# Patient Record
Sex: FEMALE | Race: WHITE | NOT HISPANIC OR LATINO | Employment: FULL TIME | URBAN - METROPOLITAN AREA
[De-identification: names, ages, dates, MRNs, and addresses within clinical notes are randomized per-mention and may not be internally consistent; named-entity substitution may affect disease eponyms.]

---

## 2018-03-19 ENCOUNTER — TRANSCRIBE ORDERS (OUTPATIENT)
Dept: ADMINISTRATIVE | Facility: HOSPITAL | Age: 48
End: 2018-03-19

## 2018-03-19 DIAGNOSIS — Z12.31 VISIT FOR SCREENING MAMMOGRAM: Primary | ICD-10-CM

## 2018-03-20 ENCOUNTER — HOSPITAL ENCOUNTER (OUTPATIENT)
Dept: MAMMOGRAPHY | Facility: HOSPITAL | Age: 48
Discharge: HOME | End: 2018-03-20
Admitting: OBSTETRICS & GYNECOLOGY

## 2018-03-20 DIAGNOSIS — Z12.31 VISIT FOR SCREENING MAMMOGRAM: ICD-10-CM

## 2018-03-20 PROCEDURE — 77063 BREAST TOMOSYNTHESIS BI: CPT | Performed by: RADIOLOGY

## 2018-03-20 PROCEDURE — 77067 SCR MAMMO BI INCL CAD: CPT | Performed by: RADIOLOGY

## 2018-03-20 PROCEDURE — 77067 SCR MAMMO BI INCL CAD: CPT

## 2018-03-20 PROCEDURE — 77063 BREAST TOMOSYNTHESIS BI: CPT

## 2019-01-07 ENCOUNTER — TRANSCRIBE ORDERS (OUTPATIENT)
Dept: ADMINISTRATIVE | Facility: HOSPITAL | Age: 49
End: 2019-01-07

## 2019-01-07 DIAGNOSIS — Z12.31 VISIT FOR SCREENING MAMMOGRAM: Primary | ICD-10-CM

## 2019-03-21 ENCOUNTER — HOSPITAL ENCOUNTER (OUTPATIENT)
Dept: MAMMOGRAPHY | Facility: HOSPITAL | Age: 49
Discharge: HOME | End: 2019-03-21
Admitting: OBSTETRICS & GYNECOLOGY

## 2019-03-21 DIAGNOSIS — Z12.31 VISIT FOR SCREENING MAMMOGRAM: ICD-10-CM

## 2019-03-21 PROCEDURE — 77063 BREAST TOMOSYNTHESIS BI: CPT | Performed by: RADIOLOGY

## 2019-03-21 PROCEDURE — 77067 SCR MAMMO BI INCL CAD: CPT | Performed by: RADIOLOGY

## 2019-03-21 PROCEDURE — 77063 BREAST TOMOSYNTHESIS BI: CPT

## 2019-03-21 PROCEDURE — 77067 SCR MAMMO BI INCL CAD: CPT

## 2020-02-10 ENCOUNTER — TRANSCRIBE ORDERS (OUTPATIENT)
Dept: ADMINISTRATIVE | Facility: HOSPITAL | Age: 50
End: 2020-02-10

## 2020-02-10 DIAGNOSIS — Z12.31 VISIT FOR SCREENING MAMMOGRAM: Primary | ICD-10-CM

## 2020-04-08 ENCOUNTER — APPOINTMENT (OUTPATIENT)
Dept: MAMMOGRAPHY | Facility: HOSPITAL | Age: 50
End: 2020-04-08

## 2020-05-07 ENCOUNTER — HOSPITAL ENCOUNTER (OUTPATIENT)
Dept: MAMMOGRAPHY | Facility: HOSPITAL | Age: 50
Discharge: HOME | End: 2020-05-07
Admitting: INTERNAL MEDICINE

## 2020-05-07 DIAGNOSIS — Z12.31 VISIT FOR SCREENING MAMMOGRAM: ICD-10-CM

## 2020-05-07 PROCEDURE — 77067 SCR MAMMO BI INCL CAD: CPT

## 2020-05-07 PROCEDURE — 77063 BREAST TOMOSYNTHESIS BI: CPT | Performed by: RADIOLOGY

## 2020-05-07 PROCEDURE — 77067 SCR MAMMO BI INCL CAD: CPT | Performed by: RADIOLOGY

## 2020-05-07 PROCEDURE — 77063 BREAST TOMOSYNTHESIS BI: CPT

## 2020-05-20 ENCOUNTER — HOSPITAL ENCOUNTER (OUTPATIENT)
Dept: ULTRASOUND IMAGING | Facility: HOSPITAL | Age: 50
Discharge: HOME | End: 2020-05-20

## 2020-05-20 ENCOUNTER — HOSPITAL ENCOUNTER (OUTPATIENT)
Dept: MAMMOGRAPHY | Facility: HOSPITAL | Age: 50
Discharge: HOME | End: 2020-05-20
Admitting: RADIOLOGY

## 2020-05-20 DIAGNOSIS — R92.8 ABNORMAL MAMMOGRAM: ICD-10-CM

## 2020-05-20 PROCEDURE — G0279 TOMOSYNTHESIS, MAMMO: HCPCS

## 2020-05-20 PROCEDURE — 77061 BREAST TOMOSYNTHESIS UNI: CPT | Performed by: RADIOLOGY

## 2020-05-20 PROCEDURE — 77065 DX MAMMO INCL CAD UNI: CPT | Performed by: RADIOLOGY

## 2020-05-20 PROCEDURE — 76642 ULTRASOUND BREAST LIMITED: CPT

## 2020-05-20 PROCEDURE — 77065 DX MAMMO INCL CAD UNI: CPT

## 2020-05-20 PROCEDURE — 76642 ULTRASOUND BREAST LIMITED: CPT | Performed by: RADIOLOGY

## 2021-06-10 ENCOUNTER — HOSPITAL ENCOUNTER (OUTPATIENT)
Dept: MAMMOGRAPHY | Facility: HOSPITAL | Age: 51
Discharge: HOME | End: 2021-06-10
Admitting: INTERNAL MEDICINE

## 2021-06-10 DIAGNOSIS — Z12.39 ENCOUNTER FOR SCREENING BREAST EXAMINATION: ICD-10-CM

## 2021-06-10 PROCEDURE — 77063 BREAST TOMOSYNTHESIS BI: CPT

## 2021-06-10 PROCEDURE — 77067 SCR MAMMO BI INCL CAD: CPT | Performed by: RADIOLOGY

## 2021-06-10 PROCEDURE — 77067 SCR MAMMO BI INCL CAD: CPT

## 2021-06-10 PROCEDURE — 77063 BREAST TOMOSYNTHESIS BI: CPT | Performed by: RADIOLOGY

## 2022-06-10 ENCOUNTER — TRANSCRIBE ORDERS (OUTPATIENT)
Dept: ADMINISTRATIVE | Facility: HOSPITAL | Age: 52
End: 2022-06-10

## 2022-06-10 DIAGNOSIS — Z12.31 ENCOUNTER FOR SCREENING MAMMOGRAM FOR BREAST CANCER: Primary | ICD-10-CM

## 2022-06-30 ENCOUNTER — HOSPITAL ENCOUNTER (OUTPATIENT)
Dept: MAMMOGRAPHY | Facility: HOSPITAL | Age: 52
Discharge: HOME | End: 2022-06-30
Admitting: INTERNAL MEDICINE

## 2022-06-30 DIAGNOSIS — Z12.31 ENCOUNTER FOR SCREENING MAMMOGRAM FOR BREAST CANCER: ICD-10-CM

## 2022-06-30 PROCEDURE — 77063 BREAST TOMOSYNTHESIS BI: CPT

## 2022-06-30 PROCEDURE — 77067 SCR MAMMO BI INCL CAD: CPT

## 2022-06-30 PROCEDURE — 77067 SCR MAMMO BI INCL CAD: CPT | Performed by: RADIOLOGY

## 2022-06-30 PROCEDURE — 77063 BREAST TOMOSYNTHESIS BI: CPT | Performed by: RADIOLOGY

## 2023-07-21 ENCOUNTER — APPOINTMENT (OUTPATIENT)
Dept: GENERAL RADIOLOGY | Facility: HOSPITAL | Age: 53
End: 2023-07-21
Payer: COMMERCIAL

## 2023-07-21 ENCOUNTER — APPOINTMENT (OUTPATIENT)
Dept: CT IMAGING | Facility: HOSPITAL | Age: 53
End: 2023-07-21
Payer: COMMERCIAL

## 2023-07-21 ENCOUNTER — HOSPITAL ENCOUNTER (OUTPATIENT)
Facility: HOSPITAL | Age: 53
Setting detail: OBSERVATION
Discharge: HOME | End: 2023-07-25
Attending: EMERGENCY MEDICINE | Admitting: INTERNAL MEDICINE
Payer: COMMERCIAL

## 2023-07-21 DIAGNOSIS — Z82.3 FAMILY HISTORY OF STROKE: ICD-10-CM

## 2023-07-21 DIAGNOSIS — R20.0 RIGHT FACIAL NUMBNESS: Primary | ICD-10-CM

## 2023-07-21 DIAGNOSIS — Z79.890 CURRENT LONG-TERM USE OF POSTMENOPAUSAL HORMONE REPLACEMENT THERAPY: ICD-10-CM

## 2023-07-21 DIAGNOSIS — R20.0 NUMBNESS AND TINGLING OF RIGHT ARM AND LEG: ICD-10-CM

## 2023-07-21 DIAGNOSIS — R42 DIZZINESS: ICD-10-CM

## 2023-07-21 DIAGNOSIS — R20.2 NUMBNESS AND TINGLING OF RIGHT ARM AND LEG: ICD-10-CM

## 2023-07-21 LAB
ALBUMIN SERPL-MCNC: 4.9 G/DL (ref 3.5–5.2)
ALBUMIN/GLOB SERPL: 1.9 G/DL
ALP SERPL-CCNC: 76 U/L (ref 39–117)
ALT SERPL W P-5'-P-CCNC: 20 U/L (ref 1–33)
ANION GAP SERPL CALCULATED.3IONS-SCNC: 13 MMOL/L (ref 5–15)
AST SERPL-CCNC: 27 U/L (ref 1–32)
BASOPHILS # BLD AUTO: 0.03 10*3/MM3 (ref 0–0.2)
BASOPHILS NFR BLD AUTO: 0.5 % (ref 0–1.5)
BILIRUB SERPL-MCNC: 0.7 MG/DL (ref 0–1.2)
BUN BLDA-MCNC: 7 MG/DL (ref 8–26)
BUN SERPL-MCNC: 8 MG/DL (ref 6–20)
BUN/CREAT SERPL: 12.5 (ref 7–25)
CA-I BLDA-SCNC: 1.22 MMOL/L (ref 1.2–1.32)
CALCIUM SPEC-SCNC: 9.8 MG/DL (ref 8.6–10.5)
CHLORIDE BLDA-SCNC: 100 MMOL/L (ref 98–109)
CHLORIDE SERPL-SCNC: 103 MMOL/L (ref 98–107)
CO2 BLDA-SCNC: 24 MMOL/L (ref 24–29)
CO2 SERPL-SCNC: 25 MMOL/L (ref 22–29)
CREAT BLDA-MCNC: 0.6 MG/DL (ref 0.6–1.3)
CREAT SERPL-MCNC: 0.64 MG/DL (ref 0.57–1)
DEPRECATED RDW RBC AUTO: 41.1 FL (ref 37–54)
EGFRCR SERPLBLD CKD-EPI 2021: 105.2 ML/MIN/1.73
EGFRCR SERPLBLD CKD-EPI 2021: 106.8 ML/MIN/1.73
EOSINOPHIL # BLD AUTO: 0.03 10*3/MM3 (ref 0–0.4)
EOSINOPHIL NFR BLD AUTO: 0.5 % (ref 0.3–6.2)
ERYTHROCYTE [DISTWIDTH] IN BLOOD BY AUTOMATED COUNT: 12.1 % (ref 12.3–15.4)
GLOBULIN UR ELPH-MCNC: 2.6 GM/DL
GLUCOSE BLDC GLUCOMTR-MCNC: 83 MG/DL (ref 70–130)
GLUCOSE SERPL-MCNC: 88 MG/DL (ref 65–99)
HCT VFR BLD AUTO: 42.5 % (ref 34–46.6)
HCT VFR BLDA CALC: 43 % (ref 38–51)
HGB BLD-MCNC: 14.3 G/DL (ref 12–15.9)
HGB BLDA-MCNC: 14.6 G/DL (ref 12–17)
HOLD SPECIMEN: NORMAL
HOLD SPECIMEN: NORMAL
IMM GRANULOCYTES # BLD AUTO: 0.01 10*3/MM3 (ref 0–0.05)
IMM GRANULOCYTES NFR BLD AUTO: 0.2 % (ref 0–0.5)
LYMPHOCYTES # BLD AUTO: 2.46 10*3/MM3 (ref 0.7–3.1)
LYMPHOCYTES NFR BLD AUTO: 39 % (ref 19.6–45.3)
MCH RBC QN AUTO: 31.2 PG (ref 26.6–33)
MCHC RBC AUTO-ENTMCNC: 33.6 G/DL (ref 31.5–35.7)
MCV RBC AUTO: 92.6 FL (ref 79–97)
MONOCYTES # BLD AUTO: 0.46 10*3/MM3 (ref 0.1–0.9)
MONOCYTES NFR BLD AUTO: 7.3 % (ref 5–12)
NEUTROPHILS NFR BLD AUTO: 3.32 10*3/MM3 (ref 1.7–7)
NEUTROPHILS NFR BLD AUTO: 52.5 % (ref 42.7–76)
NRBC BLD AUTO-RTO: 0 /100 WBC (ref 0–0.2)
PLATELET # BLD AUTO: 341 10*3/MM3 (ref 140–450)
PMV BLD AUTO: 9.2 FL (ref 6–12)
POTASSIUM BLDA-SCNC: 3.7 MMOL/L (ref 3.5–4.9)
POTASSIUM SERPL-SCNC: 3.8 MMOL/L (ref 3.5–5.2)
PROT SERPL-MCNC: 7.5 G/DL (ref 6–8.5)
RBC # BLD AUTO: 4.59 10*6/MM3 (ref 3.77–5.28)
SODIUM BLD-SCNC: 139 MMOL/L (ref 138–146)
SODIUM SERPL-SCNC: 141 MMOL/L (ref 136–145)
T4 FREE SERPL-MCNC: 1.16 NG/DL (ref 0.93–1.7)
TROPONIN T SERPL HS-MCNC: 10 NG/L
TSH SERPL DL<=0.05 MIU/L-ACNC: 0.45 UIU/ML (ref 0.27–4.2)
WBC NRBC COR # BLD: 6.31 10*3/MM3 (ref 3.4–10.8)
WHOLE BLOOD HOLD COAG: NORMAL
WHOLE BLOOD HOLD SPECIMEN: NORMAL

## 2023-07-21 PROCEDURE — 71045 X-RAY EXAM CHEST 1 VIEW: CPT

## 2023-07-21 PROCEDURE — 0042T HC CT CEREBRAL PERFUSION W/WO CONTRAST: CPT

## 2023-07-21 PROCEDURE — G0378 HOSPITAL OBSERVATION PER HR: HCPCS

## 2023-07-21 PROCEDURE — 93005 ELECTROCARDIOGRAM TRACING: CPT | Performed by: PHYSICIAN ASSISTANT

## 2023-07-21 PROCEDURE — 99222 1ST HOSP IP/OBS MODERATE 55: CPT | Performed by: INTERNAL MEDICINE

## 2023-07-21 PROCEDURE — 85014 HEMATOCRIT: CPT

## 2023-07-21 PROCEDURE — 70498 CT ANGIOGRAPHY NECK: CPT

## 2023-07-21 PROCEDURE — 99204 OFFICE O/P NEW MOD 45 MIN: CPT

## 2023-07-21 PROCEDURE — 70496 CT ANGIOGRAPHY HEAD: CPT

## 2023-07-21 PROCEDURE — 80050 GENERAL HEALTH PANEL: CPT | Performed by: EMERGENCY MEDICINE

## 2023-07-21 PROCEDURE — 70450 CT HEAD/BRAIN W/O DYE: CPT

## 2023-07-21 PROCEDURE — 99285 EMERGENCY DEPT VISIT HI MDM: CPT

## 2023-07-21 PROCEDURE — 93010 ELECTROCARDIOGRAM REPORT: CPT | Performed by: INTERNAL MEDICINE

## 2023-07-21 PROCEDURE — 84484 ASSAY OF TROPONIN QUANT: CPT | Performed by: PHYSICIAN ASSISTANT

## 2023-07-21 PROCEDURE — 80047 BASIC METABLC PNL IONIZED CA: CPT

## 2023-07-21 PROCEDURE — 25510000001 IOPAMIDOL PER 1 ML: Performed by: EMERGENCY MEDICINE

## 2023-07-21 PROCEDURE — 84439 ASSAY OF FREE THYROXINE: CPT | Performed by: PHYSICIAN ASSISTANT

## 2023-07-21 RX ORDER — ATORVASTATIN CALCIUM 40 MG/1
80 TABLET, FILM COATED ORAL NIGHTLY
Status: DISCONTINUED | OUTPATIENT
Start: 2023-07-21 | End: 2023-07-24

## 2023-07-21 RX ORDER — SODIUM CHLORIDE 0.9 % (FLUSH) 0.9 %
10 SYRINGE (ML) INJECTION AS NEEDED
Status: DISCONTINUED | OUTPATIENT
Start: 2023-07-21 | End: 2023-07-25 | Stop reason: HOSPADM

## 2023-07-21 RX ORDER — SODIUM CHLORIDE 0.9 % (FLUSH) 0.9 %
10 SYRINGE (ML) INJECTION EVERY 12 HOURS SCHEDULED
Status: DISCONTINUED | OUTPATIENT
Start: 2023-07-22 | End: 2023-07-25 | Stop reason: HOSPADM

## 2023-07-21 RX ORDER — ASPIRIN 300 MG/1
300 SUPPOSITORY RECTAL DAILY
Status: DISCONTINUED | OUTPATIENT
Start: 2023-07-22 | End: 2023-07-25 | Stop reason: HOSPADM

## 2023-07-21 RX ORDER — ASPIRIN 81 MG/1
81 TABLET, CHEWABLE ORAL DAILY
Status: DISCONTINUED | OUTPATIENT
Start: 2023-07-22 | End: 2023-07-25 | Stop reason: HOSPADM

## 2023-07-21 RX ORDER — ASPIRIN 81 MG/1
162 TABLET, CHEWABLE ORAL ONCE
Status: COMPLETED | OUTPATIENT
Start: 2023-07-21 | End: 2023-07-21

## 2023-07-21 RX ORDER — SODIUM CHLORIDE 9 MG/ML
40 INJECTION, SOLUTION INTRAVENOUS AS NEEDED
Status: DISCONTINUED | OUTPATIENT
Start: 2023-07-21 | End: 2023-07-25 | Stop reason: HOSPADM

## 2023-07-21 RX ADMIN — ATORVASTATIN CALCIUM 80 MG: 40 TABLET, FILM COATED ORAL at 21:55

## 2023-07-21 RX ADMIN — IOPAMIDOL 115 ML: 755 INJECTION, SOLUTION INTRAVENOUS at 20:04

## 2023-07-21 RX ADMIN — ASPIRIN 162 MG: 81 TABLET, CHEWABLE ORAL at 21:54

## 2023-07-21 NOTE — Clinical Note
Level of Care: Telemetry [5]   Diagnosis: Facial numbness [086035]   Admitting Physician: LORRAINE VILLAVICENCIO [[id number]]   Attending Physician: LORRAINE VILLAVICENCIO [[id number]]   Bed Request Comments: tele

## 2023-07-21 NOTE — ED PROVIDER NOTES
"Subjective   History of Present Illness  This is a healthy 53-year-old female that presents the ER with sudden onset of right facial numbness and tingling and tingling to right upper extremity and lower extremity that occurred at 5 PM.  Patient said that initially symptoms lasted 30 seconds and resolved completely.  She felt transient lightheadedness and dizziness, as well  Patient says that she took 2 aspirin 81 mg by mouth at symptom onset.  Symptoms recurred at 1830 and were less intense and lasted only 10 to 15 seconds.  Patient denies any neurologic symptoms at this time.  She denied headache, visual changes, or any difficulty thought forming or speech changes.  Patient denies personal history of hypertension or hyperlipidemia.  She says that total cholesterol is around 200 but HDL is significantly higher than LDL, so she has never been recommended to take statin medications.  She is a non-smoker.  She does report being postmenopausal and takes oral progesterone and utilizes estrogen and testosterone pellets.  She had new pellets placed last week.  She denies any chest pain or shortness of breath.  She reports family history of TIA in her sister at around the same age and she also reports history of stroke in her father at age 80.    History provided by:  Patient  Neurologic Problem  The patient's primary symptoms include focal sensory loss. The patient's pertinent negatives include no altered mental status, clumsiness, focal weakness, loss of balance, memory loss, near-syncope, slurred speech, syncope, visual change or weakness. Primary symptoms comment: Initial time, pt felt some \"spinning sensation\", which quickly resolved.. This is a new problem. The current episode started today. The neurological problem developed suddenly. The last time the patient was known to be well was 7/21/2023 5:00 PM.  The problem has been resolved since onset. There was right-sided, upper extremity, lower extremity and facial " focality noted. Associated symptoms include dizziness and light-headedness. Pertinent negatives include no abdominal pain, auditory change, aura, back pain, bladder incontinence, bowel incontinence, chest pain, confusion, diaphoresis, fatigue, fever, headaches, nausea, neck pain, palpitations, shortness of breath, vertigo or vomiting. Past treatments include aspirin (2 ASA 81 mg po).     Review of Systems   Constitutional: Negative.  Negative for diaphoresis, fatigue and fever.   Respiratory: Negative.  Negative for shortness of breath.    Cardiovascular: Negative.  Negative for chest pain, palpitations and near-syncope.   Gastrointestinal:  Negative for abdominal pain, bowel incontinence, nausea and vomiting.   Genitourinary: Negative.  Negative for bladder incontinence, dysuria, flank pain, frequency and urgency.        LMP: s/p menopausal. Pt is on progesterone orally and also gets hormone pellets with implant last week.   Musculoskeletal:  Negative for back pain and neck pain.   Neurological:  Positive for dizziness, light-headedness and numbness. Negative for vertigo, focal weakness, seizures, syncope, facial asymmetry, speech difficulty, weakness, headaches and loss of balance.        Transient right facial, RUE, and RLE numbness tingling that lasted 30 seconds at 1700 accompanied with dizziness.  This occurred again at 1830, less intense, and lasted 10-15 seconds.   Psychiatric/Behavioral:  Negative for confusion and memory loss.    All other systems reviewed and are negative.    History reviewed. No pertinent past medical history.    Allergies   Allergen Reactions    Cefdinir GI Intolerance    Erythromycin GI Intolerance    Penicillins Hives    Sulfa Antibiotics GI Intolerance       Past Surgical History:   Procedure Laterality Date    BREAST EXCISIONAL BIOPSY Left 2008    NASAL SINUS SURGERY         Family History   Problem Relation Age of Onset    Breast cancer Sister 48    Ovarian cancer Neg Hx         Social History     Socioeconomic History    Marital status:    Tobacco Use    Smoking status: Never    Smokeless tobacco: Never   Substance and Sexual Activity    Alcohol use: Not Currently    Drug use: Never    Sexual activity: Defer           Objective   Physical Exam  Vitals and nursing note reviewed.   Constitutional:       General: She is not in acute distress.     Appearance: Normal appearance. She is not ill-appearing, toxic-appearing or diaphoretic.      Comments: No acute sign of pain or distress.  Nontoxic.   HENT:      Head: Normocephalic and atraumatic.      Nose: Nose normal.      Mouth/Throat:      Mouth: Mucous membranes are moist.      Pharynx: Oropharynx is clear.   Eyes:      Extraocular Movements: Extraocular movements intact.      Conjunctiva/sclera: Conjunctivae normal.      Pupils: Pupils are equal, round, and reactive to light.   Neck:      Vascular: No carotid bruit.      Comments: No carotid bruits bilaterally  Cardiovascular:      Rate and Rhythm: Normal rate and regular rhythm. No extrasystoles are present.     Pulses: Normal pulses.      Heart sounds: Normal heart sounds. No murmur heard.     Comments: Regular rate and rhythm.  No ectopy.  No murmurs appreciated.  No pedal edema to lower extremities.  Pulmonary:      Effort: Pulmonary effort is normal.      Breath sounds: Normal breath sounds.      Comments: Lungs are clear to auscultation bilaterally  Abdominal:      General: Bowel sounds are normal.      Palpations: Abdomen is soft.   Musculoskeletal:         General: Normal range of motion.      Cervical back: Normal range of motion and neck supple.      Right lower leg: No edema.      Left lower leg: No edema.   Skin:     General: Skin is warm and dry.   Neurological:      General: No focal deficit present.      Mental Status: She is alert and oriented to person, place, and time.      Cranial Nerves: Cranial nerves 2-12 are intact.      Sensory: Sensation is intact.       Motor: Motor function is intact.      Coordination: Coordination is intact.      Gait: Gait is intact.      Comments: Alert and oriented x3.  Smile is equal and tongue is midline.  Normal finger-to-nose touch.  Equal  strength 5 out of 5 and equal muscle strength to lower extremities 5 out of 5.  Speech is clear and concentrated.  No expressive aphasia.  No focal deficits.       Procedures           ED Course  ED Course as of 07/21/23 2145 Fri Jul 21, 2023 1945 Patient is neurologic exam is completely normal.  Blood pressure is mildly elevated at 170/94.  Discussed the case with Dr. Ayala, ER attending physician.  He is also going to see and evaluate the patient now and I contacted stroke navigator nurse practitioner, Juan Luis, and he is going to come and evaluate the patient, as well to see if CT perfusion studies are recommended.  Patient denies any neurologic symptoms at this time. [FC]   1950 Evaluated this patient in person.  She reports 2 episodes of a tingling sensation in her right foot, right hand, and the right side of the face primarily the right side of the upper and lower lip.  1 episode occurred at 5 PM and lasted for 30 seconds and resolve spontaneously.  The second episode occurred at 6:30 PM and lasted 20 seconds and resolve spontaneously.  She denies any symptoms currently.  I have discussed this patient with the stroke service and they have recommended CT imaging evaluation which has been ordered.  The patient was called a code stroke [NS]   2017 Juan Luis stroke navigator nurse practitioner, came and reported that patient's CT of the brain without contrast and CT perfusion studies were normal.  NIH is 0.  He is going to discuss the case with neurology on-call, , to see if he recommends MRI of the brain without contrast.  Patient is resting comfortably. [FC]   2105 Juan Luis stroke navigator nurse practitioner discussed the case with Dr. Walter, neurologist on-call.  He recommended  overnight observation for further studies.  Patient took aspirin 81 mg by mouth x2 doses prior to arrival and they recommended aspirin 81 mg by mouth x2 more tablets.  Juan Luis will go update the patient on neurology's recommendations for admission.  I had updated patient and spouse that neurologist was reviewing the case.  Patient was resting comfortably and vital signs were stable.  They were very appreciative. [FC]   2145 Discussed admission with Dr. Ramsay, hospitalist.  She is agreeable to admission on telemetry.  Patient and spouse updated on all results and recommendations per neurology and they are agreeable. [FC]      ED Course User Index  [FC] Marissa Franco PA-C  [NS] Marcelle Ayala MD                Recent Results (from the past 24 hour(s))   ECG 12 Lead Other; neuro changes    Collection Time: 07/21/23  7:39 PM   Result Value Ref Range    QT Interval 408 ms    QTC Interval 431 ms   Comprehensive Metabolic Panel    Collection Time: 07/21/23  8:01 PM    Specimen: Arm, Left; Blood   Result Value Ref Range    Glucose 88 65 - 99 mg/dL    BUN 8 6 - 20 mg/dL    Creatinine 0.64 0.57 - 1.00 mg/dL    Sodium 141 136 - 145 mmol/L    Potassium 3.8 3.5 - 5.2 mmol/L    Chloride 103 98 - 107 mmol/L    CO2 25.0 22.0 - 29.0 mmol/L    Calcium 9.8 8.6 - 10.5 mg/dL    Total Protein 7.5 6.0 - 8.5 g/dL    Albumin 4.9 3.5 - 5.2 g/dL    ALT (SGPT) 20 1 - 33 U/L    AST (SGOT) 27 1 - 32 U/L    Alkaline Phosphatase 76 39 - 117 U/L    Total Bilirubin 0.7 0.0 - 1.2 mg/dL    Globulin 2.6 gm/dL    A/G Ratio 1.9 g/dL    BUN/Creatinine Ratio 12.5 7.0 - 25.0    Anion Gap 13.0 5.0 - 15.0 mmol/L    eGFR 105.2 >60.0 mL/min/1.73   Green Top (Gel)    Collection Time: 07/21/23  8:01 PM   Result Value Ref Range    Extra Tube Hold for add-ons.    Lavender Top    Collection Time: 07/21/23  8:01 PM   Result Value Ref Range    Extra Tube hold for add-on    Gold Top - SST    Collection Time: 07/21/23  8:01 PM   Result Value Ref Range    Extra  Tube Hold for add-ons.    Light Blue Top    Collection Time: 07/21/23  8:01 PM   Result Value Ref Range    Extra Tube Hold for add-ons.    CBC Auto Differential    Collection Time: 07/21/23  8:01 PM    Specimen: Arm, Left; Blood   Result Value Ref Range    WBC 6.31 3.40 - 10.80 10*3/mm3    RBC 4.59 3.77 - 5.28 10*6/mm3    Hemoglobin 14.3 12.0 - 15.9 g/dL    Hematocrit 42.5 34.0 - 46.6 %    MCV 92.6 79.0 - 97.0 fL    MCH 31.2 26.6 - 33.0 pg    MCHC 33.6 31.5 - 35.7 g/dL    RDW 12.1 (L) 12.3 - 15.4 %    RDW-SD 41.1 37.0 - 54.0 fl    MPV 9.2 6.0 - 12.0 fL    Platelets 341 140 - 450 10*3/mm3    Neutrophil % 52.5 42.7 - 76.0 %    Lymphocyte % 39.0 19.6 - 45.3 %    Monocyte % 7.3 5.0 - 12.0 %    Eosinophil % 0.5 0.3 - 6.2 %    Basophil % 0.5 0.0 - 1.5 %    Immature Grans % 0.2 0.0 - 0.5 %    Neutrophils, Absolute 3.32 1.70 - 7.00 10*3/mm3    Lymphocytes, Absolute 2.46 0.70 - 3.10 10*3/mm3    Monocytes, Absolute 0.46 0.10 - 0.90 10*3/mm3    Eosinophils, Absolute 0.03 0.00 - 0.40 10*3/mm3    Basophils, Absolute 0.03 0.00 - 0.20 10*3/mm3    Immature Grans, Absolute 0.01 0.00 - 0.05 10*3/mm3    nRBC 0.0 0.0 - 0.2 /100 WBC   T4, Free    Collection Time: 07/21/23  8:01 PM    Specimen: Arm, Left; Blood   Result Value Ref Range    Free T4 1.16 0.93 - 1.70 ng/dL   TSH    Collection Time: 07/21/23  8:01 PM    Specimen: Arm, Left; Blood   Result Value Ref Range    TSH 0.445 0.270 - 4.200 uIU/mL   Single High Sensitivity Troponin T    Collection Time: 07/21/23  8:01 PM    Specimen: Arm, Left; Blood   Result Value Ref Range    HS Troponin T 10 (H) <10 ng/L   POC CHEM 8    Collection Time: 07/21/23  8:01 PM    Specimen: Blood   Result Value Ref Range    Glucose 83 70 - 130 mg/dL    BUN 7 (L) 8 - 26 mg/dL    Creatinine 0.60 0.60 - 1.30 mg/dL    Sodium 139 138 - 146 mmol/L    POC Potassium 3.7 3.5 - 4.9 mmol/L    Chloride 100 98 - 109 mmol/L    Total CO2 24 24 - 29 mmol/L    Hemoglobin 14.6 12.0 - 17.0 g/dL    Hematocrit 43 38 - 51 %     Ionized Calcium 1.22 1.20 - 1.32 mmol/L    eGFR 106.8 >60.0 mL/min/1.73     Note: In addition to lab results from this visit, the labs listed above may include labs taken at another facility or during a different encounter within the last 24 hours. Please correlate lab times with ED admission and discharge times for further clarification of the services performed during this visit.    XR Chest 1 View   Final Result   Impression:   No evidence of active chest disease.         Electronically Signed: Tru Hernandes     7/21/2023 8:59 PM EDT     Workstation ID: [id number]      CT Angiogram Head w AI Analysis of LVO   Final Result   Neck CTA appears within normal limits.      CTA HEAD: Distal vertebral arteries, basilar artery and posterior cerebral arteries appear within normal limits. There is mild calcification of the supraclinoid right and left ICA. No significant intracranial ICA stenosis is seen. Anterior and middle    cerebral arteries and proximal branches appear within normal limits. No significant stenosis is seen. The major dural venous sinuses appear to opacify normally with contrast.      Impression:   CTA of the head appears within normal limits.         Electronically Signed: Tru Hernandes     7/21/2023 8:41 PM EDT     Workstation ID: [id number]      CT Angiogram Neck   Final Result   Neck CTA appears within normal limits.      CTA HEAD: Distal vertebral arteries, basilar artery and posterior cerebral arteries appear within normal limits. There is mild calcification of the supraclinoid right and left ICA. No significant intracranial ICA stenosis is seen. Anterior and middle    cerebral arteries and proximal branches appear within normal limits. No significant stenosis is seen. The major dural venous sinuses appear to opacify normally with contrast.      Impression:   CTA of the head appears within normal limits.         Electronically Signed: Tru Hernandes     7/21/2023 8:41 PM EDT     Workstation ID: [id number]      CT  "CEREBRAL PERFUSION WITH & WITHOUT CONTRAST   Final Result   Impression:   Negative perfusion scan.            Electronically Signed: Tru Cecilio     7/21/2023 8:29 PM EDT     Workstation ID: [id number]      CT Head Without Contrast Stroke Protocol   Final Result   Impression:      1. No evidence of acute intracranial disease.      2. Incidentally noted left sphenoid and ethmoid sinus disease.            Electronically Signed: Tru Cecilio     7/21/2023 8:08 PM EDT     Workstation ID: [id number]      MRI Brain Without Contrast    (Results Pending)     Vitals:    07/21/23 1915 07/21/23 2016 07/21/23 2017 07/21/23 2030   BP: 170/94 141/77  135/74   BP Location: Right arm   Right arm   Patient Position: Sitting   Sitting   Pulse: 71  85 71   Resp: 16      Temp: 97.9 °F (36.6 °C)      TempSrc: Oral      SpO2: 98%  99% 99%   Weight: 54.4 kg (120 lb)      Height: 152.4 cm (60\")        Medications   sodium chloride 0.9 % flush 10 mL (has no administration in time range)   sodium chloride 0.9 % flush 10 mL (has no administration in time range)   aspirin chewable tablet 162 mg (has no administration in time range)   atorvastatin (LIPITOR) tablet 80 mg (has no administration in time range)   iopamidol (ISOVUE-370) 76 % injection 150 mL (115 mL Intravenous Given 7/21/23 2004)     ECG/EMG Results (last 24 hours)       ** No results found for the last 24 hours. **          ECG 12 Lead Other; neuro changes   Preliminary Result   Test Reason : Other~   Blood Pressure :   */*   mmHG   Vent. Rate :  67 BPM     Atrial Rate :  67 BPM      P-R Int : 154 ms          QRS Dur :  84 ms       QT Int : 408 ms       P-R-T Axes :  70  49  46 degrees      QTc Int : 431 ms      Normal sinus rhythm   Normal ECG   No previous ECGs available      Referred By:            Confirmed By:                                        Medical Decision Making  Problems Addressed:  Current long-term use of postmenopausal hormone replacement therapy: complicated acute " illness or injury  Dizziness: complicated acute illness or injury  Family history of stroke: complicated acute illness or injury  Numbness and tingling of right arm and leg: complicated acute illness or injury  Right facial numbness: complicated acute illness or injury    Amount and/or Complexity of Data Reviewed  Labs: ordered.  Radiology: ordered.  ECG/medicine tests: ordered.    Risk  OTC drugs.  Prescription drug management.  Decision regarding hospitalization.        Final diagnoses:   Right facial numbness   Numbness and tingling of right arm and leg   Dizziness   Current long-term use of postmenopausal hormone replacement therapy   Family history of stroke       ED Disposition  ED Disposition       ED Disposition   Decision to Admit    Condition   --    Comment   Level of Care: Telemetry [5]   Diagnosis: Facial numbness [997980]   Admitting Physician: LORRAINE VILLAVICENCIO [[id number]]   Attending Physician: LORRAINE VILLAVICENCIO [[id number]]   Bed Request Comments: tele                 No follow-up provider specified.       Medication List      No changes were made to your prescriptions during this visit.            Marissa Franco PA-C  07/21/23 7502

## 2023-07-22 ENCOUNTER — APPOINTMENT (OUTPATIENT)
Dept: CARDIOLOGY | Facility: HOSPITAL | Age: 53
End: 2023-07-22
Payer: COMMERCIAL

## 2023-07-22 ENCOUNTER — APPOINTMENT (OUTPATIENT)
Dept: MRI IMAGING | Facility: HOSPITAL | Age: 53
End: 2023-07-22
Payer: COMMERCIAL

## 2023-07-22 LAB
CHOLEST SERPL-MCNC: 146 MG/DL (ref 0–200)
CRP SERPL-MCNC: <0.3 MG/DL (ref 0–0.5)
ERYTHROCYTE [SEDIMENTATION RATE] IN BLOOD: 6 MM/HR (ref 0–30)
GLUCOSE BLDC GLUCOMTR-MCNC: 134 MG/DL (ref 70–130)
GLUCOSE BLDC GLUCOMTR-MCNC: 80 MG/DL (ref 70–130)
GLUCOSE BLDC GLUCOMTR-MCNC: 87 MG/DL (ref 70–130)
GLUCOSE BLDC GLUCOMTR-MCNC: 91 MG/DL (ref 70–130)
HBA1C MFR BLD: 5 % (ref 4.8–5.6)
HDLC SERPL-MCNC: 52 MG/DL (ref 40–60)
HOLD SPECIMEN: NORMAL
LDLC SERPL CALC-MCNC: 83 MG/DL (ref 0–100)
LDLC/HDLC SERPL: 1.6 {RATIO}
TRIGL SERPL-MCNC: 54 MG/DL (ref 0–150)
VLDLC SERPL-MCNC: 11 MG/DL (ref 5–40)

## 2023-07-22 PROCEDURE — 97165 OT EVAL LOW COMPLEX 30 MIN: CPT

## 2023-07-22 PROCEDURE — 99214 OFFICE O/P EST MOD 30 MIN: CPT | Performed by: PSYCHIATRY & NEUROLOGY

## 2023-07-22 PROCEDURE — G0378 HOSPITAL OBSERVATION PER HR: HCPCS

## 2023-07-22 PROCEDURE — 99232 SBSQ HOSP IP/OBS MODERATE 35: CPT | Performed by: INTERNAL MEDICINE

## 2023-07-22 PROCEDURE — 85652 RBC SED RATE AUTOMATED: CPT | Performed by: PSYCHIATRY & NEUROLOGY

## 2023-07-22 PROCEDURE — 82948 REAGENT STRIP/BLOOD GLUCOSE: CPT

## 2023-07-22 PROCEDURE — 93306 TTE W/DOPPLER COMPLETE: CPT | Performed by: INTERNAL MEDICINE

## 2023-07-22 PROCEDURE — 86140 C-REACTIVE PROTEIN: CPT | Performed by: PSYCHIATRY & NEUROLOGY

## 2023-07-22 PROCEDURE — 83036 HEMOGLOBIN GLYCOSYLATED A1C: CPT

## 2023-07-22 PROCEDURE — 93306 TTE W/DOPPLER COMPLETE: CPT

## 2023-07-22 PROCEDURE — 97530 THERAPEUTIC ACTIVITIES: CPT

## 2023-07-22 PROCEDURE — 97161 PT EVAL LOW COMPLEX 20 MIN: CPT

## 2023-07-22 PROCEDURE — 70551 MRI BRAIN STEM W/O DYE: CPT

## 2023-07-22 PROCEDURE — 80061 LIPID PANEL: CPT

## 2023-07-22 RX ORDER — CLOPIDOGREL BISULFATE 75 MG/1
75 TABLET ORAL DAILY
Status: DISCONTINUED | OUTPATIENT
Start: 2023-07-23 | End: 2023-07-25

## 2023-07-22 RX ORDER — CLOPIDOGREL BISULFATE 75 MG/1
300 TABLET ORAL ONCE
Status: COMPLETED | OUTPATIENT
Start: 2023-07-22 | End: 2023-07-22

## 2023-07-22 RX ADMIN — CLOPIDOGREL BISULFATE 300 MG: 75 TABLET ORAL at 21:38

## 2023-07-22 RX ADMIN — ASPIRIN 81 MG: 81 TABLET, CHEWABLE ORAL at 08:44

## 2023-07-22 RX ADMIN — ATORVASTATIN CALCIUM 80 MG: 40 TABLET, FILM COATED ORAL at 20:34

## 2023-07-22 RX ADMIN — Medication 10 ML: at 20:35

## 2023-07-22 RX ADMIN — Medication 10 ML: at 00:32

## 2023-07-22 NOTE — SIGNIFICANT NOTE
07/22/23 1613   SLP Deferred Reason   SLP Deferred Reason   (SLP consult received. Will f/u for evaluation as able.)

## 2023-07-22 NOTE — THERAPY DISCHARGE NOTE
Acute Care - Occupational Therapy Discharge  [facility]    Patient Name: Anastasiya Weaver  : 1970    MRN: [id number]                              Today's Date: 2023       Admit Date: 2023    Visit Dx:     ICD-10-CM ICD-9-CM   1. Right facial numbness  R20.0 782.0   2. Numbness and tingling of right arm and leg  R20.0 782.0    R20.2    3. Dizziness  R42 780.4   4. Current long-term use of postmenopausal hormone replacement therapy  Z79.890 V07.4   5. Family history of stroke  Z82.3 V17.1     Patient Active Problem List   Diagnosis    Numbness     History reviewed. No pertinent past medical history.  Past Surgical History:   Procedure Laterality Date    BREAST EXCISIONAL BIOPSY Left     NASAL SINUS SURGERY        General Information       Row Name 23 0956          OT Time and Intention    Document Type evaluation;discharge evaluation/summary  -JY     Mode of Treatment occupational therapy;individual therapy  -JY       Row Name 23 0956          General Information    Patient Profile Reviewed yes  -JY     Prior Level of Function independent:;all household mobility;community mobility;gait;transfer;bed mobility;ADL's;feeding;grooming;dressing;bathing;home management;cooking;cleaning;driving;shopping;using stairs;work  -JY     Existing Precautions/Restrictions no known precautions/restrictions  -JY     Barriers to Rehab none identified  -JY       Row Name 23 0956          Occupational Profile    Environmental Supports and Barriers (Occupational Profile) walk in shower w/o seat, standard toilet height, no DME used at baseline, no access to any AD  -JY       Row Name 23 0956          Living Environment    People in Home spouse;other (see comments)  able to assist as needed  -J       Row Name 23 0956          Home Main Entrance    Number of Stairs, Main Entrance three  -JY     Stair Railings, Main Entrance railing on right side (ascending)  -XtoneY       Row Name 23  0956          Stairs Within Home, Primary    Number of Stairs, Within Home, Primary other (see comments)  16 with 8 + 8 set up with landing between  -JY     Stair Railings, Within Home, Primary railing on right side (ascending);railing on left side (ascending);other (see comments)  R on 1st set of 8 and L on 2nd set of 8  -JY       Row Name 07/22/23 0956          Cognition    Orientation Status (Cognition) oriented x 4  -JY       Row Name 07/22/23 0956          Safety Issues, Functional Mobility    Safety Issues Affecting Function (Mobility) other (see comments)  no safety issues noted  -JY     Impairments Affecting Function (Mobility) other (see comments)  no impairments noted  -JY     Comment, Safety Issues/Impairments (Mobility) pt alert and able to follow commands  -J               User Key  (r) = Recorded By, (t) = Taken By, (c) = Cosigned By      Initials Name Provider Type    Ning Solo OT Occupational Therapist                   Mobility/ADL's       Row Name 07/22/23 1001          Bed Mobility    Bed Mobility supine-sit-supine  -JY     Supine-Sit-Supine [place] (Bed Mobility) independent  -JY     Bed Mobility, Safety Issues other (see comments)  no safety issues noted  -J     Comment, (Bed Mobility) I in bed mobility w/o any supports, denies any dizziness or feeling LH at EOB  -Networked Organisms       [facility] Name 07/22/23 1001          Transfers    Transfers sit-stand transfer;stand-sit transfer;toilet transfer  -[facility] Name 07/22/23 1001          Sit-Stand Transfer    Sit-Stand [place] (Transfers) independent  -JY     Assistive Device (Sit-Stand Transfers) other (see comments)  no AD used  -Networked Organisms       [facility] Name 07/22/23 1001          Stand-Sit Transfer    Stand-Sit [place] (Transfers) independent  -JY     Assistive Device (Stand-Sit Transfers) other (see comments)  no AD used  -[facility] Name 07/22/23 1001          Toilet Transfer    Type (Toilet Transfer) sit-stand;stand-sit  -JY      [place] Level (Toilet Transfer) independent  -Y     Assistive Device (Toilet Transfer) commode;other (see comments)  no AD for t/fs, mobility used  -Pharmacy [employer]       Row Name 07/22/23 1001          Functional Mobility    Functional Mobility- Ind. Level independent  -     Functional Mobility-Distance (Feet) --  in room ADL distances  -     Functional Mobility- Comment defer to PT For specifics however during in room ADL related mobility pt demonstrated I w/o AD use, no LOB noted and able to move in all directions, complete dynamic challenges looking L < > R, up/down with no balance concern, able to pick item off floor  -Pharmacy Development       Row Name 07/22/23 1001          Activities of Daily Living    BADL Assessment/Intervention upper body dressing;lower body dressing;grooming;toileting  -[facility] Name 07/22/23 1001          Upper Body Dressing Assessment/Training    [place] Level (Upper Body Dressing) doff;don;pajama/robe;independent  -     Position (Upper Body Dressing) edge of bed sitting  -       Row Name 07/22/23 1001          Lower Body Dressing Assessment/Training    [place] Level (Lower Body Dressing) doff;don;socks;independent  -     Position (Lower Body Dressing) edge of bed sitting  -Pharmacy Development       Row Name 07/22/23 1001          Grooming Assessment/Training    [place] Level (Grooming) wash face, hands;independent  -Y     Position (Grooming) sink side  -       Row Name 07/22/23 1001          Toileting Assessment/Training    [place] Level (Toileting) adjust/manage clothing;perform perineal hygiene;independent  -     Assistive Devices (Toileting) commode  -     Position (Toileting) unsupported sitting;unsupported standing  -               User Key  (r) = Recorded By, (t) = Taken By, (c) = Cosigned By      Initials Name Provider Type    Ning Solo OT Occupational Therapist                   Obj/Interventions       Row Name 07/22/23 1010          Sensory Assessment  (Somatosensory)    Sensory Assessment (Somatosensory) bilateral UE;sensation intact  -JY     Bilateral UE Sensory Assessment general sensation;light touch awareness;light touch localization;intact  -JY     Sensory Assessment denies any numbness or tingling and able to recognize LT stimuli as intact and symmetrical at BUEs  -       Row Name 07/22/23 1010          Vision Assessment/Intervention    Visual Impairment/Limitations corrective lenses for reading  -     Vision Assessment Comment denies any acute changes to vision, able to track L < >R, up/down, responds to divergence/convergence and correctly identifies L & R peripheral input  -[facility] Name 07/22/23 1010          Range of Motion Comprehensive    General Range of Motion bilateral upper extremity ROM WNL  -[facility] Name 07/22/23 1010          Strength Comprehensive (MMT)    General Manual Muscle Testing (MMT) Assessment no strength deficits identified  -JY     Comment, General Manual Muscle Testing (MMT) Assessment [facility] MMS 5/5 per MMT  -[facility] Name 07/22/23 1010          Motor Skills    Motor Skills coordination  -     Coordination bilateral;upper extremity;fine motor deficit;gross motor deficit;finger to nose;other (see comments);WNL  finger thumb opposition  -[facility] Name 07/22/23 1010          Balance    Balance Assessment sitting static balance;sitting dynamic balance;standing static balance;standing dynamic balance  -JY     Static Sitting Balance independent  -JY     Dynamic Sitting Balance independent  -JY     Static Standing Balance independent  -JY     Dynamic Standing Balance independent  -JY     Position/Device Used, Standing Balance other (see comments)  no AD used  -JY     Balance Interventions sitting;standing;static;dynamic;sit to stand;supported;occupation based/functional task  -JY     Comment, Balance no AD used, no overt LOB in seated or standing tasks  -JY               User Key  (r) = Recorded By, (t) = Taken By,  (c) = Cosigned By      Initials Name Provider Type    Ning Solo, OT Occupational Therapist                   Goals/Plan       Row Name 07/22/23 1033          Problem Specific Goal 1 (OT)    Problem Specific Goal 1 (OT) Pt to verbalize/demonstrate understanding/competency in recognizing s/s of CVA and seeking timely care when needed and further home safety awareness in order to improve d/c readiness when medically ready.  -JY     Time Frame (Problem Specific Goal 1, OT) short term goal (STG);by discharge  -JY     Progress/Outcome (Problem Specific Goal 1, OT) goal met  -JY       Row Name 07/22/23 1033          Therapy Assessment/Plan (OT)    Planned Therapy Interventions (OT) patient/caregiver education/training  -               User Key  (r) = Recorded By, (t) = Taken By, (c) = Cosigned By      Initials Name Provider Type    Ning Solo, OT Occupational Therapist                   Clinical Impression       Row Name 07/22/23 1023          Pain Assessment    Pretreatment Pain Rating 0/10 - no pain  -JY     Posttreatment Pain Rating 0/10 - no pain  -JY     Pre/Posttreatment Pain Comment denies any pain and tolerated all OT interventions  -JY     Pain Intervention(s) Repositioned;Ambulation/increased activity;Rest  -[facility] Name 07/22/23 1023          Plan of Care Review    Plan of Care Reviewed With patient  -JY     Progress improving  -JY     Outcome Evaluation OT evaluation completed. Pt presents with baseline performance in ADLs and related t/fs and mobility with I in all w/o any use of AD. Pt has functional skill set including BUE strength, sensory awareness, coordination, ROM, balance and denies any acute changes to vision and indicates admitting s/s have resolved. Pt was educated on s/s of CVA and seeking timely care in prep for recommended return home with family A as needed at d/c. No further OT services req'd at this time, will d/c.  -[facility] Name 07/22/23 1023          Therapy  Assessment/Plan (OT)    Patient/Family Therapy Goal Statement (OT) to maximize I in ADLs, related t/fs, mobility, return to PLOF  -JY     Rehab Potential (OT) good, to achieve stated therapy goals  -JY     Criteria for Skilled Therapeutic Interventions Met (OT) yes;skilled treatment is necessary;other (see comments)  pt benefitted from education re: s/s CVA  -JY     Therapy Frequency (OT) evaluation only  -JY       Row Name 07/22/23 1023          Therapy Plan Review/Discharge Plan (OT)    Anticipated Discharge Disposition (OT) home with assist  -JY       Row Name 07/22/23 1023          Vital Signs    Pre Systolic BP Rehab 112  -JY     Pre Treatment Diastolic BP 71  -JY     Post Systolic BP Rehab 128  -JY     Post Treatment Diastolic BP 76  -JY     Pretreatment Heart Rate (beats/min) 64  -JY     Posttreatment Heart Rate (beats/min) 64  -JY     Pre SpO2 (%) 97  -JY     O2 Delivery Pre Treatment room air  -JY     O2 Delivery Intra Treatment room air  -JY     Post SpO2 (%) 97  -JY     O2 Delivery Post Treatment room air  -JY     Pre Patient Position Supine  -JY     Intra Patient Position Standing  -JY     Post Patient Position Supine  -JY       Row Name 07/22/23 1023          Positioning and Restraints    Pre-Treatment Position in bed  -JY     Post Treatment Position bed  -JY     In Bed notified nsg;fowlers;call light within reach;encouraged to call for assist;side rails up x2  declined SCDs, bed alarm not engaged upon OT arrival, pt up I in room  -JY               User Key  (r) = Recorded By, (t) = Taken By, (c) = Cosigned By      Initials Name Provider Type    Ning Solo, OT Occupational Therapist                   Outcome Measures       Row Name 07/22/23 1034          How much help from another is currently needed...    Putting on and taking off regular lower body clothing? 4  -JY     Bathing (including washing, rinsing, and drying) 4  -JY     Toileting (which includes using toilet bed pan or urinal) 4  -JY      Putting on and taking off regular upper body clothing 4  -JY     Taking care of personal grooming (such as brushing teeth) 4  -JY     Eating meals 4  -JY     AM-PAC 6 Clicks Score (OT) 24  -JY       Row Name 07/22/23 0725          How much help from another person do you currently need...    Turning from your back to your side while in flat bed without using bedrails? 4  -LS     Moving from lying on back to sitting on the side of a flat bed without bedrails? 4  -LS     Moving to and from a bed to a chair (including a wheelchair)? 4  -LS     Standing up from a chair using your arms (e.g., wheelchair, bedside chair)? 4  -LS     Climbing 3-5 steps with a railing? 4  -LS     To walk in hospital room? 4  -LS     AM-PAC 6 Clicks Score (PT) 24  -LS     Highest level of mobility 8 --> Walked 250 feet or more  -LS       Row Name 07/22/23 1034 07/22/23 0725       Modified Imperial Scale    Pre-Stroke Modified Imperial Scale 0 - No Symptoms at all.  -JY 0 - No Symptoms at all.  -LS    Modified Isaias Scale 0 - No Symptoms at all.  -JY 0 - No Symptoms at all.  -      Row Name 07/22/23 1034 07/22/23 0725       Functional Assessment    Outcome Measure Options AM-PAC 6 Clicks Daily Activity (OT);Modified Imperial  -JY AM-PAC 6 Clicks Basic Mobility (PT);Modified Imperial  -LS              User Key  (r) = Recorded By, (t) = Taken By, (c) = Cosigned By      Initials Name Provider Type    LS Sarah Ann, PT Physical Therapist    Ning Solo, OT Occupational Therapist                  Occupational Therapy Education       Title: PT OT SLP Therapies (In Progress)       Topic: Occupational Therapy (In Progress)       Point: ADL training (Done)       Description:   Instruct learner(s) on proper safety adaptation and remediation techniques during self care or transfers.   Instruct in proper use of assistive devices.                  Learning Progress Summary             Patient Acceptance, E,D, VU,DU by BETITO at 7/22/2023 0970                          Point: Home exercise program (Not Started)       Description:   Instruct learner(s) on appropriate technique for monitoring, assisting and/or progressing therapeutic exercises/activities.                  Learner Progress:  Not documented in this visit.              Point: Precautions (Done)       Description:   Instruct learner(s) on prescribed precautions during self-care and functional transfers.                  Learning Progress Summary             Patient Acceptance, E,D, VU,DU by BETITO at 7/22/2023 0934                         Point: Body mechanics (Done)       Description:   Instruct learner(s) on proper positioning and spine alignment during self-care, functional mobility activities and/or exercises.                  Learning Progress Summary             Patient Acceptance, E,D, VU,DU by BETITO at 7/22/2023 0934                                         User Key       Initials Effective Dates Name Provider Type Discipline    BETITO 06/16/21 -  Ning Ramires OT Occupational Therapist OT                  OT Recommendation and Plan  Planned Therapy Interventions (OT): patient/caregiver education/training  Therapy Frequency (OT): evaluation only  Plan of Care Review  Plan of Care Reviewed With: patient  Progress: improving  Outcome Evaluation: OT evaluation completed. Pt presents with baseline performance in ADLs and related t/fs and mobility with I in all w/o any use of AD. Pt has functional skill set including BUE strength, sensory awareness, coordination, ROM, balance and denies any acute changes to vision and indicates admitting s/s have resolved. Pt was educated on s/s of CVA and seeking timely care in prep for recommended return home with family A as needed at d/c. No further OT services req'd at this time, will d/c.  Plan of Care Reviewed With: patient  Outcome Evaluation: OT evaluation completed. Pt presents with baseline performance in ADLs and related t/fs and mobility with I in all w/o any  use of AD. Pt has functional skill set including BUE strength, sensory awareness, coordination, ROM, balance and denies any acute changes to vision and indicates admitting s/s have resolved. Pt was educated on s/s of CVA and seeking timely care in prep for recommended return home with family A as needed at d/c. No further OT services req'd at this time, will d/c.     Time Calculation:   Evaluation Complexity (OT)  Review Occupational Profile/Medical/Therapy History Complexity: brief/low complexity  Assessment, Occupational Performance/Identification of Deficit Complexity: 1-3 performance deficits  Clinical Decision Making Complexity (OT): problem focused assessment/low complexity  Overall Complexity of Evaluation (OT): low complexity     Time Calculation- OT       Row Name 07/22/23 1036             Time Calculation- OT    OT Start Time 0934  -JY      OT Received On 07/22/23  -JY         Timed Charges    39137 - OT Therapeutic Activity Minutes 9  -JY         Untimed Charges    OT Eval/Re-eval Minutes 40  -JY         Total Minutes    Timed Charges Total Minutes 9  -JY      Untimed Charges Total Minutes 40  -JY       Total Minutes 49  -JY                User Key  (r) = Recorded By, (t) = Taken By, (c) = Cosigned By      Initials Name Provider Type    Ning Solo OT Occupational Therapist                  Therapy Charges for Today       Code Description Service Date Service Provider Modifiers Qty    [id number]  OT THERAPEUTIC ACT EA 15 MIN 7/22/2023 Ning Ramires OT GO 1    [id number]  OT EVAL LOW COMPLEXITY 3 7/22/2023 Ning Ramires OT GO 1               OT Discharge Summary  Anticipated Discharge Disposition (OT): home with assist  Reason for Discharge: All goals achieved, Independent, At baseline function  Outcomes Achieved: Able to achieve all goals within established timeline, Refer to plan of care for updates on goals achieved  Discharge Destination: Home with assist    Ning Ramires  OT  7/22/2023

## 2023-07-22 NOTE — CONSULTS
Stroke Consult Note    Patient Name: Anastasiya Weaver   MRN: [id number]  Age: 53 y.o.  Sex: female  : 1970    Primary Care Physician: Pilar Street MD  Referring Physician:  Dr. Ayala    TIME STROKE TEAM CALLED:  EST     TIME PATIENT SEEN:  EST    Handedness: Right  Race: White     Chief Complaint/Reason for Consultation: Dizziness, transient right-sided paresthesias    HPI: 53-year-old female with no known past medical history presents to [facility] ED with transient episodes of right facial numbness and paresthesias to right upper extremity and right lower extremity.  Initial onset was at approximately 1700 today and lasted 30 seconds and resolved.  At that time of symptom onset patient took (2) 81 mg aspirin.  Second episode at approximately 1830 lasted 10 to 15 seconds and resolved.  Per patient report she did not work today however, she did attend a horse event outside with her daughter.    On arrival [facility] ED NIH 0.  Blood pressure 170/94.  On exam patient is able to answer questions appropriately and follow two-step commands.  Denies any sensory deficits or visual disturbance.  Strength in all extremities 5/5.  No headache, nausea, falls or recent trauma.  Does have family history of TIA and stroke.  Non-smoker and no EtOH use.    Last Known Normal Date/Time: 2023 at 1700 EST     Review of Systems   Constitutional:  Negative for fever.   HENT:  Negative for trouble swallowing and voice change.    Eyes:  Negative for visual disturbance.   Respiratory:  Negative for shortness of breath.    Cardiovascular:  Negative for chest pain.   Gastrointestinal:  Negative for abdominal pain and nausea.   Neurological:  Positive for dizziness, weakness and numbness. Negative for facial asymmetry, speech difficulty and headaches.   Psychiatric/Behavioral:  Negative for confusion.     No past medical history on file.  Past Surgical History:   Procedure Laterality Date    BREAST EXCISIONAL BIOPSY Left  2008     Family History   Problem Relation Age of Onset    Breast cancer Sister 48    Ovarian cancer Neg Hx      Social History     Socioeconomic History    Marital status:      Allergies   Allergen Reactions    Cefdinir GI Intolerance    Erythromycin GI Intolerance    Penicillins Hives    Sulfa Antibiotics GI Intolerance     Prior to Admission medications    Not on File         Temp:  [97.9 °F (36.6 °C)] 97.9 °F (36.6 °C)  Heart Rate:  [71] 71  Resp:  [16] 16  BP: (170)/(94) 170/94  Neurological Exam  Mental Status  Alert. Oriented to person, place and time. Oriented to person, place, and time. Speech is normal. Language is fluent with no aphasia.    Cranial Nerves  CN II: Visual fields full to confrontation.  CN III, IV, VI: Extraocular movements intact bilaterally. Pupils equal round and reactive to light bilaterally.  CN V: Facial sensation is normal.  CN VII: Full and symmetric facial movement.  CN VIII: Hearing intact.  CN XI: Shoulder shrug strength is normal.  CN XII: Tongue midline without atrophy or fasciculations.    Motor  Normal muscle bulk throughout. Normal muscle tone. Strength is 5/5 throughout all four extremities.    Sensory  Light touch is normal in upper and lower extremities.     Coordination  Right: Finger-to-nose normal.Left: Finger-to-nose normal.    Gait    Unable to assess.    Physical Exam  Constitutional:       General: She is not in acute distress.     Appearance: Normal appearance. She is not ill-appearing.   HENT:      Head: Normocephalic and atraumatic.      Nose: Nose normal.      Mouth/Throat:      Mouth: Mucous membranes are dry.   Eyes:      Extraocular Movements: Extraocular movements intact.      Pupils: Pupils are equal, round, and reactive to light.   Cardiovascular:      Pulses: Normal pulses.   Pulmonary:      Effort: Pulmonary effort is normal. No respiratory distress.   Abdominal:      General: Abdomen is flat.   Musculoskeletal:         General: Normal range of  motion.      Cervical back: Normal range of motion.   Skin:     General: Skin is warm and dry.   Neurological:      Mental Status: She is alert and oriented to person, place, and time.      Cranial Nerves: No cranial nerve deficit.      Sensory: No sensory deficit.      Motor: Motor strength is normal. No weakness.      Coordination: Coordination normal.   Psychiatric:         Mood and Affect: Mood normal.         Speech: Speech normal.         Behavior: Behavior normal.       Acute Stroke Data    Thrombolytic Inclusion / Exclusion Criteria    Time: 20:01 EDT  Person Administering Scale: LAW Nair    Inclusion Criteria  [x]   18 years of age or greater   [x]   Onset of symptoms < 4.5 hours before beginning treatment (stroke onset = time patient was last seen well or without symptoms).   []   Diagnosis of acute ischemic stroke causing measurable disabling deficit (Complete Hemianopia, Any Aphasia, Visual or Sensory Extinction, Any weakness limiting sustained effort against gravity)   []   Any remaining deficit considered potentially disabling in view of patient and practitioner   Exclusion criteria (Do not proceed with Alteplase if any are checked under exclusion criteria)  []   Onset unknown or GREATER than 4.5 hours   []   ICH on CT/MRI   []   CT demonstrates hypodensity representing acute or subacute infarct   []   Significant head trauma or prior stroke in the previous 3 months   []   Symptoms suggestive of subarachnoid hemorrhage   []   History of un-ruptured intracranial aneurysm GREATER than 10 mm   []   Recent intracranial or intraspinal surgery within the last 3 months   []   Arterial puncture at a non-compressible site in the previous 7 days   []   Active internal bleeding   []   Acute bleeding tendency   []   Platelet count LESS than 100,000 for known hematological diseases such as leukemia, thrombocytopenia or chronic cirrhosis   []   Current use of anticoagulant with INR GREATER than 1.7 or PT  GREATER than 15 seconds, aPTT GREATER than 40 seconds   []   Heparin received within 48 hours, resulting in abnormally elevated aPTT GREATER than upper limit of normal   []   Current use of direct thrombin inhibitors or direct factor Xa inhibitors in the past 48 hours   []   Elevated blood pressure refractory to treatment (systolic GREATER than 185 mm/Hg or diastolic  GREATER than 110 mm/Hg   []   Suspected infective endocarditis and aortic arch dissection   []   Current use of therapeutic treatment dose of low-molecular-weight heparin (LMWH) within the previous 24 hours   []   Structural GI malignancy or bleed   Relative exclusion for all patients  []   Only minor non-disabling symptoms   []   Pregnancy   []   Seizure at onset with postictal residual neurological impairments   []   Major surgery or previous trauma within past 14 days   []   History of previous spontaneous ICH, intracranial neoplasm, or AV malformation   []   Postpartum (within previous 14 days)   []   Recent GI or urinary tract hemorrhage (within previous 21 days)   []   Recent acute MI (within previous 3 months)   []   History of un-ruptured intracranial aneurysm LESS than 10 mm   []   History of ruptured intracranial aneurysm   []   Blood glucose LESS than 50 mg/dL (2.7 mmol/L)   []   Dural puncture within the last 7 days   []   Known GREATER than 10 cerebral microbleeds   Additional exclusions for patients with symptoms onset between 3 and 4.5 hours.  []   Age > 80.   []   On any anticoagulants regardless of INR  >>> Warfarin (Coumadin), Heparin, Enoxaparin (Lovenox), fondaparinux (Arixtra), bivalirudin (Angiomax), Argatroban, dabigatran (Pradaxa), rivaroxaban (Xarelto), or apixaban (Eliquis)   []   Severe stroke (NIHSS > 25).   []   History of BOTH diabetes and previous ischemic stroke.   []   The risks and benefits have been discussed with the patient or family related to the administration of IV thrombolytic therapy for stroke symptoms.   []    I have discussed and reviewed the patient's case and imaging with the attending prior to IV thrombolytic therapy.   NA Time IV thrombolytic administered   Transient symptoms have resolved.    Hospital Meds:  Scheduled-    Infusions-     PRNs-   sodium chloride    Insert Peripheral IV **AND** sodium chloride    Functional Status Prior to Current Stroke/Isaias Score:   MODIFIED ISAIAS SCALE (to be assessed for each patient having history of stroke) []Stroke history but not assessed  [x]0: No symptoms at all  []1: No significant disability despite symptoms  []2: Slight disability  []3: Moderate disability  []4: Moderately severe disability  []5: Severe disability  []6: Death        NIH Stroke Scale  Time: 20:01 EDT  Person Administering Scale: LAW Nair  Interval: baseline  1a. Level of Consciousness: 0-->Alert, keenly responsive  1b. LOC Questions: 0-->Answers both questions correctly  1c. LOC Commands: 0-->Performs both tasks correctly  2. Best Gaze: 0-->Normal  3. Visual: 0-->No visual loss  4. Facial Palsy: 0-->Normal symmetrical movements  5a. Motor Arm, Left: 0-->No drift, limb holds 90 (or 45) degrees for full 10 secs  5b. Motor Arm, Right: 0-->No drift, limb holds 90 (or 45) degrees for full 10 secs  6a. Motor Leg, Left: 0-->No drift, leg holds 30 degree position for full 5 secs  6b. Motor Leg, Right: 0-->No drift, leg holds 30 degree position for full 5 secs  7. Limb Ataxia: 0-->Absent  8. Sensory: 0-->Normal, no sensory loss  9. Best Language: 0-->No aphasia, normal  10. Dysarthria: 0-->Normal  11. Extinction and Inattention (formerly Neglect): 0-->No abnormality    Total (NIH Stroke Scale): 0       Results Reviewed:  I have personally reviewed current lab, radiology, and data and agree with results.  WBC   Date Value Ref Range Status   07/21/2023 6.31 3.40 - 10.80 10*3/mm3 Final     RBC   Date Value Ref Range Status   07/21/2023 4.59 3.77 - 5.28 10*6/mm3 Final     Hemoglobin   Date Value Ref Range  Status   07/21/2023 14.3 12.0 - 15.9 g/dL Final   07/21/2023 14.6 12.0 - 17.0 g/dL Final     Comment:     Serial Number: [id number]:  [id number]     Hematocrit   Date Value Ref Range Status   07/21/2023 42.5 34.0 - 46.6 % Final   07/21/2023 43 38 - 51 % Final     MCV   Date Value Ref Range Status   07/21/2023 92.6 79.0 - 97.0 fL Final     MCH   Date Value Ref Range Status   07/21/2023 31.2 26.6 - 33.0 pg Final     MCHC   Date Value Ref Range Status   07/21/2023 33.6 31.5 - 35.7 g/dL Final     RDW   Date Value Ref Range Status   07/21/2023 12.1 (L) 12.3 - 15.4 % Final     RDW-SD   Date Value Ref Range Status   07/21/2023 41.1 37.0 - 54.0 fl Final     MPV   Date Value Ref Range Status   07/21/2023 9.2 6.0 - 12.0 fL Final     Platelets   Date Value Ref Range Status   07/21/2023 341 140 - 450 10*3/mm3 Final     Neutrophil %   Date Value Ref Range Status   07/21/2023 52.5 42.7 - 76.0 % Final     Lymphocyte %   Date Value Ref Range Status   07/21/2023 39.0 19.6 - 45.3 % Final     Monocyte %   Date Value Ref Range Status   07/21/2023 7.3 5.0 - 12.0 % Final     Eosinophil %   Date Value Ref Range Status   07/21/2023 0.5 0.3 - 6.2 % Final     Basophil %   Date Value Ref Range Status   07/21/2023 0.5 0.0 - 1.5 % Final     Immature Grans %   Date Value Ref Range Status   07/21/2023 0.2 0.0 - 0.5 % Final     Neutrophils, Absolute   Date Value Ref Range Status   07/21/2023 3.32 1.70 - 7.00 10*3/mm3 Final     Lymphocytes, Absolute   Date Value Ref Range Status   07/21/2023 2.46 0.70 - 3.10 10*3/mm3 Final     Monocytes, Absolute   Date Value Ref Range Status   07/21/2023 0.46 0.10 - 0.90 10*3/mm3 Final     Eosinophils, Absolute   Date Value Ref Range Status   07/21/2023 0.03 0.00 - 0.40 10*3/mm3 Final     Basophils, Absolute   Date Value Ref Range Status   07/21/2023 0.03 0.00 - 0.20 10*3/mm3 Final     Immature Grans, Absolute   Date Value Ref Range Status   07/21/2023 0.01 0.00 - 0.05 10*3/mm3 Final     nRBC   Date Value Ref  Range Status   07/21/2023 0.0 0.0 - 0.2 /100 WBC Final      Lab Results   Component Value Date    GLUCOSE 88 07/21/2023    BUN 8 07/21/2023    CREATININE 0.64 07/21/2023    CREATININE 0.60 07/21/2023    EGFR 105.2 07/21/2023    EGFR 106.8 07/21/2023    BCR 12.5 07/21/2023    K 3.8 07/21/2023    CO2 25.0 07/21/2023    CALCIUM 9.8 07/21/2023    ALBUMIN 4.9 07/21/2023    BILITOT 0.7 07/21/2023    AST 27 07/21/2023    ALT 20 07/21/2023    CT Angiogram Neck    Result Date: 7/21/2023  Neck CTA appears within normal limits. CTA HEAD: Distal vertebral arteries, basilar artery and posterior cerebral arteries appear within normal limits. There is mild calcification of the supraclinoid right and left ICA. No significant intracranial ICA stenosis is seen. Anterior and middle cerebral arteries and proximal branches appear within normal limits. No significant stenosis is seen. The major dural venous sinuses appear to opacify normally with contrast. Impression: CTA of the head appears within normal limits. Electronically Signed: Tru Hernandes  7/21/2023 8:41 PM EDT  Workstation ID: [id number]    XR Chest 1 View    Result Date: 7/21/2023  Impression: No evidence of active chest disease. Electronically Signed: Tru Hernandes  7/21/2023 8:59 PM EDT  Workstation ID: [id number]    CT Head Without Contrast Stroke Protocol    Result Date: 7/21/2023  Impression: 1. No evidence of acute intracranial disease. 2. Incidentally noted left sphenoid and ethmoid sinus disease. Electronically Signed: Tru Hernandes  7/21/2023 8:08 PM EDT  Workstation ID: [id number]    CT Angiogram Head w AI Analysis of LVO    Result Date: 7/21/2023  Neck CTA appears within normal limits. CTA HEAD: Distal vertebral arteries, basilar artery and posterior cerebral arteries appear within normal limits. There is mild calcification of the supraclinoid right and left ICA. No significant intracranial ICA stenosis is seen. Anterior and middle cerebral arteries and proximal branches appear  within normal limits. No significant stenosis is seen. The major dural venous sinuses appear to opacify normally with contrast. Impression: CTA of the head appears within normal limits. Electronically Signed: Tru Cecilio  7/21/2023 8:41 PM EDT  Workstation ID: [id number]    CT CEREBRAL PERFUSION WITH & WITHOUT CONTRAST    Result Date: 7/21/2023  Impression: Negative perfusion scan. Electronically Signed: Tru Cecilio  7/21/2023 8:29 PM EDT  Workstation ID: [id number]         Assessment/Plan:    53-year-old female with no known past medical history presents to [facility] ED with transient episodes of right facial numbness and paresthesias to right upper extremity and right lower extremity.  Initial onset was at approximately 1700 today and lasted 30 seconds and resolved.  At that time of symptom onset patient took (2) 81 mg aspirin.  Second episode at approximately 1830 lasted 10 to 15 seconds and resolved.  Per patient report she did not work today however, she did attend a horse event outside with her daughter.  Patient is not a candidate for IV thrombolytic therapy due to presenting symptoms resolved.  Patient is not a candidate for endovascular therapy due to no LVO on CT scans.    Antiplatelet PTA: None  Anticoagulant PTA: None        Dizziness, transient right-sided paresthesias  Differentials to include TIA, CVA  Initiate TIA/CVA without IV thrombolytic therapy order set  N.p.o. until bedside dysphagia screening completed by RN  Activity as tolerated  Aspirin 81 mg p.o. daily start 7/22/2023 in a.m.  Atorvastatin 80 mg p.o. nightly  A1c, lipid panel routine  MRI brain without contrast  Blood pressure goals, SBP less than 220  TTE routine  Diabetes educator to see if appropriate  PT/OT/SLP eval and treat  Case management to follow    Plan of care discussed Dr. Ayala, Dr. Walter, patient and bedside RN.  Stroke neurology will continue to follow.  Thank you for this consult.  Call for any questions or concerns.    Juan Luis  George, LAW  July 21, 2023  20:01 EDT

## 2023-07-22 NOTE — PROGRESS NOTES
[facility] Medicine Services  PROGRESS NOTE    Patient Name: Anastasiya Weaver  : 1970  MRN: [id number]    Date of Admission: 2023  Primary Care Physician: Pilar Street MD    Subjective   Subjective     CC:  numbness    HPI:  No further numbness in hands, feet or face.  Denies focal weakness. No fever or chills.  Denies dysuria    ROS:  Gen: no fever  : no dysuria     Objective   Objective     Vital Signs:   Temp:  [97.8 °F (36.6 °C)-98.7 °F (37.1 °C)] 98.3 °F (36.8 °C)  Heart Rate:  [58-85] 67  Resp:  [16-18] 17  BP: (106-170)/(61-94) 118/77     Physical Exam:  Constitutional - no acute distress, nontoxic, in bed  HEENT-NCAT, mucous membranes moist  CV-RRR  Resp-CTAB  Abd-soft, nontender, nondistended,  Ext-No lower extremity cyanosis, clubbing or edema bilaterally  Neuro-alert and oriented, speech clear, moves all extremities, EOMI, face and smile symmetric, tongue midline,  5/5 bilaterally, lifts each leg off the bed, sensation to light touch intact LE bilaterally  Psych-normal affect   Skin- No rash on exposed UE or LE bilaterally      Results Reviewed:  LAB RESULTS:      Lab 23   WBC 6.31   HEMOGLOBIN 14.3   HEMOGLOBIN, POC 14.6   HEMATOCRIT 42.5   HEMATOCRIT POC 43   PLATELETS 341   NEUTROS ABS 3.32   IMMATURE GRANS (ABS) 0.01   LYMPHS ABS 2.46   MONOS ABS 0.46   EOS ABS 0.03   MCV 92.6         Lab 23  0556 23   SODIUM  --  141   POTASSIUM  --  3.8   CHLORIDE  --  103   CO2  --  25.0   ANION GAP  --  13.0   BUN  --  8   CREATININE  --  0.60  0.64   EGFR  --  105.2  106.8   GLUCOSE  --  88   CALCIUM  --  9.8   HEMOGLOBIN A1C 5.00  --    TSH  --  0.445         Lab 23   TOTAL PROTEIN 7.5   ALBUMIN 4.9   GLOBULIN 2.6   ALT (SGPT) 20   AST (SGOT) 27   BILIRUBIN 0.7   ALK PHOS 76         Lab 23   HSTROP T 10*         Lab 23  0556   CHOLESTEROL 146   LDL CHOL 83   HDL CHOL 52   TRIGLYCERIDES 54              Brief Urine Lab Results       None            Microbiology Results Abnormal       None            CT Angiogram Neck    Result Date: 7/21/2023  CT ANGIOGRAM NECK, CT ANGIOGRAM HEAD W AI ANALYSIS OF LVO Date of Exam: 7/21/2023 7:58 PM EDT Indication: right hand/foot/face tingling. Comparison: None available. Technique: CTA of the neck was performed before and after the uneventful intravenous administration of 115 mL Isovue-370. Reconstructed coronal and sagittal images were also obtained. In addition, a 3-D volume rendered image was created for interpretation. Automated exposure control and iterative reconstruction methods were used. Findings: CTA NECK: There is no evidence of significant common, internal, or external carotid artery stenosis or significant carotid plaque in the neck. Vertebral arteries are codominant, uniform and normal in appearance along the length. There is no evidence of significant incidental soft tissue neck pathology. Included lung apices and superior mediastinum appear grossly normal.     Impression: Neck CTA appears within normal limits. CTA HEAD: Distal vertebral arteries, basilar artery and posterior cerebral arteries appear within normal limits. There is mild calcification of the supraclinoid right and left ICA. No significant intracranial ICA stenosis is seen. Anterior and middle cerebral arteries and proximal branches appear within normal limits. No significant stenosis is seen. The major dural venous sinuses appear to opacify normally with contrast. Impression: CTA of the head appears within normal limits. Electronically Signed: Tru Hernandes  7/21/2023 8:41 PM EDT  Workstation ID: [id number]    XR Chest 1 View    Result Date: 7/21/2023  XR CHEST 1 VW Date of Exam: 7/21/2023 8:33 PM EDT Indication: neuro changes, numbness/tingling Comparison: None available. Findings: The heart, mediastinum and pulmonary vasculature appear within normal limits. Lungs appear well expanded to mildly  hyperinflated and clear of active disease. Slight coarsening of the pulmonary interstitial markings is likely chronic. A few granulomatous calcifications are seen in the right suprahilar region.     Impression: Impression: No evidence of active chest disease. Electronically Signed: Tru Hernandes  7/21/2023 8:59 PM EDT  Workstation ID: [id number]    CT Head Without Contrast Stroke Protocol    Result Date: 7/21/2023  CT HEAD WO CONTRAST STROKE PROTOCOL Date of Exam: 7/21/2023 7:55 PM EDT Indication: Transient ischemic attack (TIA). Comparison: None available. Technique: Axial CT images were obtained of the head without contrast administration.  Reconstructed coronal images were also obtained. Automated exposure control and iterative construction methods were used. Scan Time: 2000 Results discussed with stroke team nurse navigator at 2003, 7/21/2023. Findings: The calvarium appears intact. The left sphenoid sinus, and a couple of adjacent ethmoid sinuses are fluid opacified but the remaining paranasal sinuses and mastoids appear clear. Middle ear spaces appear clear. No gross abnormalities are seen of the orbits. The brain appears within normal limits. No evidence of hemorrhage, contusion, or edema is seen. There is no evidence of mass or mass effect, hydrocephalus, or abnormal extra-axial collection. No unusual focal or generalized brain atrophy is seen. No significant white matter changes are appreciated.     Impression: Impression: 1. No evidence of acute intracranial disease. 2. Incidentally noted left sphenoid and ethmoid sinus disease. Electronically Signed: Tru Hernandes  7/21/2023 8:08 PM EDT  Workstation ID: [id number]    CT Angiogram Head w AI Analysis of LVO    Result Date: 7/21/2023  CT ANGIOGRAM NECK, CT ANGIOGRAM HEAD W AI ANALYSIS OF LVO Date of Exam: 7/21/2023 7:58 PM EDT Indication: right hand/foot/face tingling. Comparison: None available. Technique: CTA of the neck was performed before and after the uneventful  intravenous administration of 115 mL Isovue-370. Reconstructed coronal and sagittal images were also obtained. In addition, a 3-D volume rendered image was created for interpretation. Automated exposure control and iterative reconstruction methods were used. Findings: CTA NECK: There is no evidence of significant common, internal, or external carotid artery stenosis or significant carotid plaque in the neck. Vertebral arteries are codominant, uniform and normal in appearance along the length. There is no evidence of significant incidental soft tissue neck pathology. Included lung apices and superior mediastinum appear grossly normal.     Impression: Neck CTA appears within normal limits. CTA HEAD: Distal vertebral arteries, basilar artery and posterior cerebral arteries appear within normal limits. There is mild calcification of the supraclinoid right and left ICA. No significant intracranial ICA stenosis is seen. Anterior and middle cerebral arteries and proximal branches appear within normal limits. No significant stenosis is seen. The major dural venous sinuses appear to opacify normally with contrast. Impression: CTA of the head appears within normal limits. Electronically Signed: Tru Hernandes  7/21/2023 8:41 PM EDT  Workstation ID: [id number]    CT CEREBRAL PERFUSION WITH & WITHOUT CONTRAST    Result Date: 7/21/2023  CT CEREBRAL PERFUSION W WO CONTRAST Date of Exam: 7/21/2023 7:58 PM EDT Indication: Neuro deficit, acute, stroke suspected.  Comparison: None available. Technique: Axial CT images of the brain were obtained prior to and after the administration of 115 mL Isovue-370. Core blood volume, core blood flow, mean transit time, and Tmax images were obtained utilizing the Rapid software protocol. A limited CT angiogram of the head was also performed to measure the blood vessel density. The radiation dose reduction device was turned on for each scan per the ALARA (As Low as Reasonably Achievable) protocol.  Findings: Rapid analysis is negative with no evidence of the brain showing cerebral blood flow less than 30% or T-max greater than 6 seconds. Individual perfusion maps show no significant asymmetry of blood flow to suggest focal ischemia or infarct.     Impression: Impression: Negative perfusion scan. Electronically Signed: Tru Hernandes  7/21/2023 8:29 PM EDT  Workstation ID: [id number]         Current medications:  Scheduled Meds:aspirin, 81 mg, Oral, Daily   Or  aspirin, 300 mg, Rectal, Daily  atorvastatin, 80 mg, Oral, Nightly  sodium chloride, 10 mL, Intravenous, Q12H      Continuous Infusions:   PRN Meds:.  sodium chloride    Insert Peripheral IV **AND** sodium chloride    sodium chloride    sodium chloride    Assessment & Plan   Assessment & Plan     Active Hospital Problems    Diagnosis  POA    Numbness [R20.0]  Yes      Resolved Hospital Problems   No resolved problems to display.        Brief Hospital Course to date:  Anastasiya Weaver is a 53 y.o. female with limited PMHx presents with right sided numbness    Right sided numbness  - symptoms currently resolved  - denies history of migraines  - no history of tobacco abuse or DMII (A1c 5.0)  - CTA head and neck negative for stenosis or thrombus  - CT head unremarkable (except for possible sinusitis)  - EKG NSR  - LDL 83  - echo and MRI brain pending  - continue aspirin and statin    Possible sinusitis  - consider flonase      Expected Discharge Location and Transportation:   Expected Discharge   Expected Discharge Date: 7/22/2023; Expected Discharge Time:      DVT prophylaxis:  Mechanical DVT prophylaxis orders are present.     AM-PAC 6 Clicks Score (PT): 24 (07/22/23 1000)    CODE STATUS:   Code Status and Medical Interventions:   Ordered at: 07/22/23 0002     Level Of Support Discussed With:    Patient     Code Status (Patient has no pulse and is not breathing):    CPR (Attempt to Resuscitate)     Medical Interventions (Patient has pulse or is breathing):     Full Support     Release to patient:    Routine Release       Dipesh Sorenson MD  07/22/23

## 2023-07-22 NOTE — H&P
[facility] Medicine Services  HISTORY AND PHYSICAL    Patient Name: Anastasiya Weaver  : 1970  MRN: [id number]  Primary Care Physician: Pilar Street MD  Date of admission: 2023      Subjective   Subjective     Chief Complaint:  Numbness    HPI:  Anastasiya Weaver is a 53 y.o. female with no known medical history who comes to the ED due to numbness.  Patient says this evening around 5 PM she had acute onset right sided face, right lips, right hand, right leg numbness, vertigo.  She says the symptoms lasted for around 30 seconds and resolved completely.  Around 6:30 PM she had recurrence of symptoms lasting less than 20 seconds, once again completely resolving.  She was concerned for stroke and came to the ED.  She denies headache, weakness, speech difficulty, facial droop, prior similar symptoms.      Review of Systems   Constitutional: Negative.    HENT: Negative.     Eyes: Negative.    Respiratory: Negative.     Cardiovascular: Negative.    Gastrointestinal: Negative.    Endocrine: Negative.    Genitourinary: Negative.    Musculoskeletal: Negative.    Skin: Negative.    Allergic/Immunologic: Negative.    Neurological:  Positive for dizziness and numbness.   Hematological: Negative.    Psychiatric/Behavioral: Negative.          Personal History     History reviewed. No pertinent past medical history.          Past Surgical History:   Procedure Laterality Date    BREAST EXCISIONAL BIOPSY Left     NASAL SINUS SURGERY         Family History: family history includes Breast cancer (age of onset: 48) in her sister; Stroke in her father; Transient ischemic attack in her sister.     Social History:  reports that she has never smoked. She has never used smokeless tobacco. She reports that she does not currently use alcohol. She reports that she does not use drugs.  Social History     Social History Narrative    Not on file       Medications:  Available home medication  information reviewed.  Medications Prior to Admission   Medication Sig Dispense Refill Last Dose    PROGESTERONE PO drospiren-e.estrad-l.mefol 3 mg-0.02 mg-0.451 mg(24)/0.451 mg(4)tablet          Allergies   Allergen Reactions    Cefdinir GI Intolerance    Erythromycin GI Intolerance    Penicillins Hives    Sulfa Antibiotics GI Intolerance       Objective   Objective     Vital Signs:   Temp:  [97.8 °F (36.6 °C)-97.9 °F (36.6 °C)] 97.8 °F (36.6 °C)  Heart Rate:  [58-85] 62  Resp:  [16] 16  BP: (124-170)/(73-94) 124/73  Total (NIH Stroke Scale): 0    Physical Exam   Constitutional: Awake, alert  Eyes: PERRLA, sclerae anicteric, no conjunctival injection  HENT: NCAT, mucous membranes moist  Neck: Supple, no thyromegaly, no lymphadenopathy, trachea midline  Respiratory: Clear to auscultation bilaterally, nonlabored respirations   Cardiovascular: RRR, no murmurs, rubs, or gallops, palpable pedal pulses bilaterally  Gastrointestinal: Positive bowel sounds, soft, nontender, nondistended  Musculoskeletal: No bilateral ankle edema, no clubbing or cyanosis to extremities  Psychiatric: Appropriate affect, cooperative  Neurologic: Oriented x 3, strength symmetric in all extremities, Cranial Nerves grossly intact to confrontation, speech clear  Skin: No rashes      Result Review:  I have personally reviewed the results from the time of this admission to 7/22/2023 00:02 EDT and agree with these findings:  [x]  Laboratory list / accordion  []  Microbiology  [x]  Radiology  [x]  EKG/Telemetry   []  Cardiology/Vascular   []  Pathology  [x]  Old records  []         LAB RESULTS:      Lab 07/21/23 2001   WBC 6.31   HEMOGLOBIN 14.3   HEMOGLOBIN, POC 14.6   HEMATOCRIT 42.5   HEMATOCRIT POC 43   PLATELETS 341   NEUTROS ABS 3.32   IMMATURE GRANS (ABS) 0.01   LYMPHS ABS 2.46   MONOS ABS 0.46   EOS ABS 0.03   MCV 92.6         Lab 07/21/23 2001   SODIUM 141   POTASSIUM 3.8   CHLORIDE 103   CO2 25.0   ANION GAP 13.0   BUN 8   CREATININE 0.60   0.64   EGFR 105.2  106.8   GLUCOSE 88   CALCIUM 9.8   TSH 0.445         Lab 07/21/23 2001   TOTAL PROTEIN 7.5   ALBUMIN 4.9   GLOBULIN 2.6   ALT (SGPT) 20   AST (SGOT) 27   BILIRUBIN 0.7   ALK PHOS 76         Lab 07/21/23 2001   HSTROP T 10*                     Microbiology Results (last 10 days)       ** No results found for the last 240 hours. **            CT Angiogram Neck    Result Date: 7/21/2023  CT ANGIOGRAM NECK, CT ANGIOGRAM HEAD W AI ANALYSIS OF LVO Date of Exam: 7/21/2023 7:58 PM EDT Indication: right hand/foot/face tingling. Comparison: None available. Technique: CTA of the neck was performed before and after the uneventful intravenous administration of 115 mL Isovue-370. Reconstructed coronal and sagittal images were also obtained. In addition, a 3-D volume rendered image was created for interpretation. Automated exposure control and iterative reconstruction methods were used. Findings: CTA NECK: There is no evidence of significant common, internal, or external carotid artery stenosis or significant carotid plaque in the neck. Vertebral arteries are codominant, uniform and normal in appearance along the length. There is no evidence of significant incidental soft tissue neck pathology. Included lung apices and superior mediastinum appear grossly normal.     Impression: Neck CTA appears within normal limits. CTA HEAD: Distal vertebral arteries, basilar artery and posterior cerebral arteries appear within normal limits. There is mild calcification of the supraclinoid right and left ICA. No significant intracranial ICA stenosis is seen. Anterior and middle cerebral arteries and proximal branches appear within normal limits. No significant stenosis is seen. The major dural venous sinuses appear to opacify normally with contrast. Impression: CTA of the head appears within normal limits. Electronically Signed: Tru Hernandes  7/21/2023 8:41 PM EDT  Workstation ID: [id number]    XR Chest 1 View    Result Date:  7/21/2023  XR CHEST 1 VW Date of Exam: 7/21/2023 8:33 PM EDT Indication: neuro changes, numbness/tingling Comparison: None available. Findings: The heart, mediastinum and pulmonary vasculature appear within normal limits. Lungs appear well expanded to mildly hyperinflated and clear of active disease. Slight coarsening of the pulmonary interstitial markings is likely chronic. A few granulomatous calcifications are seen in the right suprahilar region.     Impression: Impression: No evidence of active chest disease. Electronically Signed: Tru Hernandes  7/21/2023 8:59 PM EDT  Workstation ID: [id number]    CT Head Without Contrast Stroke Protocol    Result Date: 7/21/2023  CT HEAD WO CONTRAST STROKE PROTOCOL Date of Exam: 7/21/2023 7:55 PM EDT Indication: Transient ischemic attack (TIA). Comparison: None available. Technique: Axial CT images were obtained of the head without contrast administration.  Reconstructed coronal images were also obtained. Automated exposure control and iterative construction methods were used. Scan Time: 2000 Results discussed with stroke team nurse navigator at 2003, 7/21/2023. Findings: The calvarium appears intact. The left sphenoid sinus, and a couple of adjacent ethmoid sinuses are fluid opacified but the remaining paranasal sinuses and mastoids appear clear. Middle ear spaces appear clear. No gross abnormalities are seen of the orbits. The brain appears within normal limits. No evidence of hemorrhage, contusion, or edema is seen. There is no evidence of mass or mass effect, hydrocephalus, or abnormal extra-axial collection. No unusual focal or generalized brain atrophy is seen. No significant white matter changes are appreciated.     Impression: Impression: 1. No evidence of acute intracranial disease. 2. Incidentally noted left sphenoid and ethmoid sinus disease. Electronically Signed: Tru Hernandes  7/21/2023 8:08 PM EDT  Workstation ID: [id number]    CT Angiogram Head w AI Analysis of  LVO    Result Date: 7/21/2023  CT ANGIOGRAM NECK, CT ANGIOGRAM HEAD W AI ANALYSIS OF LVO Date of Exam: 7/21/2023 7:58 PM EDT Indication: right hand/foot/face tingling. Comparison: None available. Technique: CTA of the neck was performed before and after the uneventful intravenous administration of 115 mL Isovue-370. Reconstructed coronal and sagittal images were also obtained. In addition, a 3-D volume rendered image was created for interpretation. Automated exposure control and iterative reconstruction methods were used. Findings: CTA NECK: There is no evidence of significant common, internal, or external carotid artery stenosis or significant carotid plaque in the neck. Vertebral arteries are codominant, uniform and normal in appearance along the length. There is no evidence of significant incidental soft tissue neck pathology. Included lung apices and superior mediastinum appear grossly normal.     Impression: Neck CTA appears within normal limits. CTA HEAD: Distal vertebral arteries, basilar artery and posterior cerebral arteries appear within normal limits. There is mild calcification of the supraclinoid right and left ICA. No significant intracranial ICA stenosis is seen. Anterior and middle cerebral arteries and proximal branches appear within normal limits. No significant stenosis is seen. The major dural venous sinuses appear to opacify normally with contrast. Impression: CTA of the head appears within normal limits. Electronically Signed: Tru Hernandes  7/21/2023 8:41 PM EDT  Workstation ID: [id number]    CT CEREBRAL PERFUSION WITH & WITHOUT CONTRAST    Result Date: 7/21/2023  CT CEREBRAL PERFUSION W WO CONTRAST Date of Exam: 7/21/2023 7:58 PM EDT Indication: Neuro deficit, acute, stroke suspected.  Comparison: None available. Technique: Axial CT images of the brain were obtained prior to and after the administration of 115 mL Isovue-370. Core blood volume, core blood flow, mean transit time, and Tmax images  were obtained utilizing the Rapid software protocol. A limited CT angiogram of the head was also performed to measure the blood vessel density. The radiation dose reduction device was turned on for each scan per the ALARA (As Low as Reasonably Achievable) protocol. Findings: Rapid analysis is negative with no evidence of the brain showing cerebral blood flow less than 30% or T-max greater than 6 seconds. Individual perfusion maps show no significant asymmetry of blood flow to suggest focal ischemia or infarct.     Impression: Impression: Negative perfusion scan. Electronically Signed: Tru Hernandes  7/21/2023 8:29 PM EDT  Workstation ID: [id number]         Assessment & Plan   Assessment & Plan     Active Hospital Problems    Diagnosis  POA    Numbness [R20.0]  Yes     Anastasiya Weaver is a 53 y.o. female with no known medical history who comes to the ED due to numbness.     TIA  --stroke chance, neurology consulted  --serial NIH/neurochecks  --aspirin 324 given  --continue aspirin 81mg oral/ 300 rectal daily  --high intensity statin  --lipid panel, a1c, tsh  --echo with bubble  --CT head, CTA head and neck, CT perfusion noted above  --MRI pending  --PT/OT/SLP  --Telemetry  --Permissive hypertension pending MRI results    Total time spent: 55 minutes  Time spent includes time reviewing chart, face-to-face time, counseling patient/family/caregiver, ordering medications/tests/procedures, communicating with other health care professionals, documenting clinical information in the electronic health record, and coordination of care.       DVT prophylaxis: SCDs      CODE STATUS: Full code  Code Status and Medical Interventions:   Ordered at: 07/22/23 0002     Level Of Support Discussed With:    Patient     Code Status (Patient has no pulse and is not breathing):    CPR (Attempt to Resuscitate)     Medical Interventions (Patient has pulse or is breathing):    Full Support     Release to patient:    Routine Release       Expected  Discharge  Expected Discharge Date: 7/22/2023; Expected Discharge Time:      Estee Quintanilla DO  07/22/23

## 2023-07-22 NOTE — PROGRESS NOTES
Neurology Note    Patient:  Anastasiya Weaver    YOB: 1970    REFERRING PHYSICIAN:  Dr. Sorenson    CHIEF COMPLAINT:    Right-sided numbness    HISTORY OF PRESENT ILLNESS:   The patient is reports feeling back to baseline. She had two brief episodes of right-sided of tingling f/a/l for 15-30 seconds last night not a/w other symptoms, or a headache, not triggered by anxiety. She denies prior neurological symptoms, vascular risks, h/o migraine, family h/o MS.    Past Medical History:  History reviewed. No pertinent past medical history.    Past Surgical History:  Past Surgical History:   Procedure Laterality Date    BREAST EXCISIONAL BIOPSY Left 2008    NASAL SINUS SURGERY         Social History:   Social History     Socioeconomic History    Marital status:    Tobacco Use    Smoking status: Never    Smokeless tobacco: Never   Vaping Use    Vaping Use: Never used   Substance and Sexual Activity    Alcohol use: Not Currently    Drug use: Never    Sexual activity: Defer        Family History:   Family History   Problem Relation Age of Onset    Stroke Father     Breast cancer Sister 48    Transient ischemic attack Sister     Ovarian cancer Neg Hx        Medications Prior to Admission:    Prior to Admission medications    Medication Sig Start Date End Date Taking? Authorizing Provider   PROGESTERONE PO drospiren-e.estrad-l.mefol 3 mg-0.02 mg-0.451 mg(24)/0.451 mg(4)tablet   Yes Provider, Historical, MD       Allergies:  Cefdinir, Erythromycin, Penicillins, and Sulfa antibiotics      Review of system  Review of Systems   HENT:  Negative for trouble swallowing.    Musculoskeletal:  Negative for gait problem.   Neurological:  Negative for speech difficulty, weakness and numbness.   All other systems reviewed and are negative.    Vitals:    07/22/23 1105   BP: 115/75   Pulse: 66   Resp: 17   Temp: 98.3 °F (36.8 °C)   SpO2: 97%       Physical exam  Physical Exam  Constitutional:       Appearance: She is  well-developed.   Eyes:      Extraocular Movements: Extraocular movements intact.      Pupils: Pupils are equal, round, and reactive to light.   Cardiovascular:      Rate and Rhythm: Normal rate and regular rhythm.   Pulmonary:      Effort: Pulmonary effort is normal.   Neurological:      General: No focal deficit present.      Mental Status: She is alert and oriented to person, place, and time.      Cranial Nerves: Cranial nerves 2-12 are intact.      Sensory: Sensation is intact.      Motor: Motor function is intact.      Deep Tendon Reflexes: Reflexes are normal and symmetric. Babinski sign absent on the right side. Babinski sign absent on the left side.      Comments: Speech clear, VFF, no facial droop, no limb drift.   Psychiatric:         Behavior: Behavior normal.         Thought Content: Thought content normal.         Lab Results   Component Value Date    WBC 6.31 07/21/2023    HGB 14.3 07/21/2023    HGB 14.6 07/21/2023    HCT 42.5 07/21/2023    HCT 43 07/21/2023    MCV 92.6 07/21/2023     07/21/2023     Lab Results   Component Value Date    GLUCOSE 88 07/21/2023    BUN 8 07/21/2023    CREATININE 0.64 07/21/2023    CREATININE 0.60 07/21/2023    BCR 12.5 07/21/2023    CO2 25.0 07/21/2023    CALCIUM 9.8 07/21/2023    ALBUMIN 4.9 07/21/2023    AST 27 07/21/2023    ALT 20 07/21/2023     Lipid Panel  Order: [id number]  Status: Final result       Visible to patient: No (scheduled for 7/22/2023  8:39 AM)       Next appt: None    Specimen Information: Blood   0 Result Notes      Component  Ref Range & Units 05:56   Total Cholesterol  0 - 200 mg/dL 146   Triglycerides  0 - 150 mg/dL 54   HDL Cholesterol  40 - 60 mg/dL 52   LDL Cholesterol  0 - 100 mg/dL 83   VLDL Cholesterol  5 - 40 mg/dL 11   LDL/HDL Ratio 1.60        Hemoglobin A1C  4.80 - 5.60 % 5.00   ECG 12 Lead Other; neuro changes (Order [id number])  Order-Level Documents:    Scan on 7/21/2023 1939 by [employer] [place]: ECG 12-LEAD         Author: --  Service: -- Author Type: --   Filed: Date of Service: Creation Time:   Status: (Other)   Test Reason : Other~  Blood Pressure :   */*   mmHG  Vent. Rate :  67 BPM     Atrial Rate :  67 BPM     P-R Int : 154 ms          QRS Dur :  84 ms      QT Int : 408 ms       P-R-T Axes :  70  49  46 degrees     QTc Int : 431 ms     Normal sinus rhythm  Normal ECG  No previous ECGs available      Echocardiogram Findings    Left Ventricle Left ventricular systolic function is hyperdynamic (EF > 70%). Calculated left ventricular EF = 70.2%     Normal left ventricular cavity size and wall thickness noted. All left ventricular wall segments contract normally.   Right Ventricle Normal right ventricular cavity size, wall thickness, systolic function and septal motion noted.   Left Atrium Normal left atrial size and volume noted.  Saline test results are negative.   Right Atrium Normal right atrial cavity size noted.   Aortic Valve The aortic valve is structurally normal with no regurgitation or stenosis present.   Mitral Valve The mitral valve is structurally normal with no significant stenosis present. No significant mitral valve regurgitation is present.   Tricuspid Valve The tricuspid valve is structurally normal with no significant stenosis present. Trace tricuspid valve regurgitation is present. Estimated right ventricular systolic pressure from tricuspid regurgitation is normal (<35 mmHg).   Pulmonic Valve The pulmonic valve is structurally normal with no significant stenosis present. There is no significant pulmonic valve regurgitation present.   Greater Vessels No dilation of the aortic root is present. No dilation of the sinuses of Valsalva is present.   Pericardium The pericardium is normal. There is no evidence of pericardial effusion. .       Radiological Studies:   MRI BRAIN WO CONTRAST     Date of Exam: 7/22/2023 8:00 PM EDT     Indication: Stroke, follow up.     Comparison: 7/21/2023.     Technique:  Routine  multiplanar/multisequence sequence images of the brain were obtained without contrast administration.        Findings:  There is no diffusion restriction to suggest acute infarct. There is no evidence of acute or chronic intracranial hemorrhage. No mass effect or midline shift. No abnormal extra-axial collections. No significant FLAIR signal changes identified. No   significant signal abnormality identified. The major vascular flow voids appear intact. The basal ganglia, brainstem and cerebellum appear within normal limits. Calvarial and superficial soft tissue signal is within normal limits. Orbits appear   unremarkable. The paranasal sinuses and the mastoid air cells appear well aerated. Patchy mucosal thickening is present within the left sphenoid sinus and the posterior ethmoid air cells on the left with probable occlusion of the left sphenoethmoidal   ostia. No air-fluid levels identified. Midline structures are intact.         IMPRESSION:  Impression:  Unremarkable brain MRI. No acute process identified. No evidence of recent or acute ischemia. No significant signal abnormality.     Left-sided paranasal sinus disease as described above with no air-fluid levels identified to suggest acute sinusitis.           Electronically Signed: Sherley Zuluaga    7/22/2023 8:22 PM EDT    Workstation ID: [id number] Angiogram Neck    Result Date: 7/21/2023  CT ANGIOGRAM NECK, CT ANGIOGRAM HEAD W AI ANALYSIS OF LVO Date of Exam: 7/21/2023 7:58 PM EDT Indication: right hand/foot/face tingling. Comparison: None available. Technique: CTA of the neck was performed before and after the uneventful intravenous administration of 115 mL Isovue-370. Reconstructed coronal and sagittal images were also obtained. In addition, a 3-D volume rendered image was created for interpretation. Automated exposure control and iterative reconstruction methods were used. Findings: CTA NECK: There is no evidence of significant common, internal, or external  carotid artery stenosis or significant carotid plaque in the neck. Vertebral arteries are codominant, uniform and normal in appearance along the length. There is no evidence of significant incidental soft tissue neck pathology. Included lung apices and superior mediastinum appear grossly normal.     Neck CTA appears within normal limits. CTA HEAD: Distal vertebral arteries, basilar artery and posterior cerebral arteries appear within normal limits. There is mild calcification of the supraclinoid right and left ICA. No significant intracranial ICA stenosis is seen. Anterior and middle cerebral arteries and proximal branches appear within normal limits. No significant stenosis is seen. The major dural venous sinuses appear to opacify normally with contrast. Impression: CTA of the head appears within normal limits. Electronically Signed: Tru Hernandes  7/21/2023 8:41 PM EDT  Workstation ID: [id number]    XR Chest 1 View    Result Date: 7/21/2023  XR CHEST 1 VW Date of Exam: 7/21/2023 8:33 PM EDT Indication: neuro changes, numbness/tingling Comparison: None available. Findings: The heart, mediastinum and pulmonary vasculature appear within normal limits. Lungs appear well expanded to mildly hyperinflated and clear of active disease. Slight coarsening of the pulmonary interstitial markings is likely chronic. A few granulomatous calcifications are seen in the right suprahilar region.     Impression: No evidence of active chest disease. Electronically Signed: Tru Hernandes  7/21/2023 8:59 PM EDT  Workstation ID: [id number]    CT Head Without Contrast Stroke Protocol    Result Date: 7/21/2023  CT HEAD WO CONTRAST STROKE PROTOCOL Date of Exam: 7/21/2023 7:55 PM EDT Indication: Transient ischemic attack (TIA). Comparison: None available. Technique: Axial CT images were obtained of the head without contrast administration.  Reconstructed coronal images were also obtained. Automated exposure control and iterative construction methods were  used. Scan Time: 2000 Results discussed with stroke team nurse navigator at 2003, 7/21/2023. Findings: The calvarium appears intact. The left sphenoid sinus, and a couple of adjacent ethmoid sinuses are fluid opacified but the remaining paranasal sinuses and mastoids appear clear. Middle ear spaces appear clear. No gross abnormalities are seen of the orbits. The brain appears within normal limits. No evidence of hemorrhage, contusion, or edema is seen. There is no evidence of mass or mass effect, hydrocephalus, or abnormal extra-axial collection. No unusual focal or generalized brain atrophy is seen. No significant white matter changes are appreciated.     Impression: 1. No evidence of acute intracranial disease. 2. Incidentally noted left sphenoid and ethmoid sinus disease. Electronically Signed: Tru Hernandes  7/21/2023 8:08 PM EDT  Workstation ID: [id number]    CT Angiogram Head w AI Analysis of LVO    Result Date: 7/21/2023  CT ANGIOGRAM NECK, CT ANGIOGRAM HEAD W AI ANALYSIS OF LVO Date of Exam: 7/21/2023 7:58 PM EDT Indication: right hand/foot/face tingling. Comparison: None available. Technique: CTA of the neck was performed before and after the uneventful intravenous administration of 115 mL Isovue-370. Reconstructed coronal and sagittal images were also obtained. In addition, a 3-D volume rendered image was created for interpretation. Automated exposure control and iterative reconstruction methods were used. Findings: CTA NECK: There is no evidence of significant common, internal, or external carotid artery stenosis or significant carotid plaque in the neck. Vertebral arteries are codominant, uniform and normal in appearance along the length. There is no evidence of significant incidental soft tissue neck pathology. Included lung apices and superior mediastinum appear grossly normal.     Neck CTA appears within normal limits. CTA HEAD: Distal vertebral arteries, basilar artery and posterior cerebral arteries  appear within normal limits. There is mild calcification of the supraclinoid right and left ICA. No significant intracranial ICA stenosis is seen. Anterior and middle cerebral arteries and proximal branches appear within normal limits. No significant stenosis is seen. The major dural venous sinuses appear to opacify normally with contrast. Impression: CTA of the head appears within normal limits. Electronically Signed: Tru Hernandes  7/21/2023 8:41 PM EDT  Workstation ID: [id number]    CT CEREBRAL PERFUSION WITH & WITHOUT CONTRAST    Result Date: 7/21/2023  CT CEREBRAL PERFUSION W WO CONTRAST Date of Exam: 7/21/2023 7:58 PM EDT Indication: Neuro deficit, acute, stroke suspected.  Comparison: None available. Technique: Axial CT images of the brain were obtained prior to and after the administration of 115 mL Isovue-370. Core blood volume, core blood flow, mean transit time, and Tmax images were obtained utilizing the Rapid software protocol. A limited CT angiogram of the head was also performed to measure the blood vessel density. The radiation dose reduction device was turned on for each scan per the ALARA (As Low as Reasonably Achievable) protocol. Findings: Rapid analysis is negative with no evidence of the brain showing cerebral blood flow less than 30% or T-max greater than 6 seconds. Individual perfusion maps show no significant asymmetry of blood flow to suggest focal ischemia or infarct.     Impression: Negative perfusion scan. Electronically Signed: Tru Hernandes  7/21/2023 8:29 PM EDT  Workstation ID: [id number]         During this visit the following were done:  Labs Reviewed [x]    Labs Ordered []    Radiology Reports Reviewed [x]    Radiology Ordered []    EKG, echo, and/or stress test reviewed [x]    EEG results reviewed  []    EEG reviewed and interpreted per myself   []    Discussed case with neurointerventionalist or neuroradiologist []    Referring Provider Records Reviewed []    ER Records Reviewed []     Hospital Records Reviewed []    History Obtained From Family []    Radiological images view and Interpreted per myself [x]    Case Discussed with referring provider [x]     Decision to obtain and request outside records  []        Assessment and Plan     Recurrent paresthesia right f/a/l, cause unclear, favor migraine aura given normal work-up. Recurrent symptoms argue against embolism. No evidence of white matter disease or acute changes on MRI, personally reviewed. CTP/CTA H/N normal.   - Home on aspirin 81 mg.   - Normal BP goal.   - Check CRP, ESR, will follow-up.   - Outpatient neurology F/U prn recurrent symptoms.    Call for questions, will see prn. Thanks.              Electronically signed by Jeet Bradshaw MD on 7/22/2023 at 15:10 EDT

## 2023-07-22 NOTE — THERAPY DISCHARGE NOTE
Patient Name: Anastasiya Weaver  : 1970    MRN: [id number]                              Today's Date: 2023       Admit Date: 2023    Visit Dx:     ICD-10-CM ICD-9-CM   1. Right facial numbness  R20.0 782.0   2. Numbness and tingling of right arm and leg  R20.0 782.0    R20.2    3. Dizziness  R42 780.4   4. Current long-term use of postmenopausal hormone replacement therapy  Z79.890 V07.4   5. Family history of stroke  Z82.3 V17.1     Patient Active Problem List   Diagnosis    Numbness     History reviewed. No pertinent past medical history.  Past Surgical History:   Procedure Laterality Date    BREAST EXCISIONAL BIOPSY Left     NASAL SINUS SURGERY        General Information       Row Name 2325          Physical Therapy Time and Intention    Document Type evaluation;discharge evaluation/summary  -LS     Mode of Treatment physical therapy  -LS       Row Name 2325          General Information    Patient Profile Reviewed yes  Pt said symptoms have resolved.  -LS     Prior Level of Function independent:;all household mobility;community mobility;using stairs;driving;work  -LS     Existing Precautions/Restrictions no known precautions/restrictions  -LS       Row Name 2325          Living Environment    People in Home spouse  -LS       Row Name 2325          Cognition    Orientation Status (Cognition) oriented x 4  -LS               User Key  (r) = Recorded By, (t) = Taken By, (c) = Cosigned By      Initials Name Provider Type    Sarah Valencia, PT Physical Therapist                   Mobility       Row Name 2325          Bed Mobility    Bed Mobility supine-sit-supine  -LS     Supine-Sit-Supine [place] (Bed Mobility) independent  -LS     Comment, (Bed Mobility) indep from flat bed surface without bedrail  -LS       Row Name 2325          Sit-Stand Transfer    Sit-Stand [place] (Transfers) independent  -LS     Comment,  (Sit-Stand Transfer) indep without a.d.  -LS       Row Name 07/22/23 0725          Gait/Stairs (Locomotion)    [place] Level (Gait) independent  -LS     Distance in Feet (Gait) 300  -LS     [place] Level (Stairs) independent  -LS     Handrail Location (Stairs) none  -LS     Number of Steps (Stairs) 5  -LS     Ascending Technique (Stairs) step-over-step  -LS     Comment, (Gait/Stairs) step-through gait pattern at a quick pace. no losses of balance. easily walked up and down stairs without a handrail or any external support.  -LS               User Key  (r) = Recorded By, (t) = Taken By, (c) = Cosigned By      Initials Name Provider Type    Sarah Valencia PT Physical Therapist                   Obj/Interventions       Row Name 07/22/23 0725          Range of Motion Comprehensive    General Range of Motion bilateral lower extremity ROM WFL  -[facility] Name 07/22/23 0725          Strength Comprehensive (MMT)    Comment, General Manual Muscle Testing (MMT) Assessment B hip flexors 4+. B knee extensors and B ankle dorsiflexors 5/5.  -LS       Row Name 07/22/23 0725          Motor Skills    Motor Skills coordination  -LS     Coordination heel to shin;WFL  BLEs  -[facility] Name 07/22/23 0725          Balance    Balance Assessment standing dynamic balance  -LS     Dynamic Standing Balance independent  indep without a.d.  -LS               User Key  (r) = Recorded By, (t) = Taken By, (c) = Cosigned By      Initials Name Provider Type    Sarah Valencia PT Physical Therapist                   Goals/Plan    No documentation.                  Clinical Impression       Row Name 07/22/23 0725          Pain    Pretreatment Pain Rating 0/10 - no pain  -LS     Posttreatment Pain Rating 0/10 - no pain  -LS       Row Name 07/22/23 0725          Plan of Care Review    Plan of Care Reviewed With patient  -LS     Outcome Evaluation Pt is indep with mobility. She has equal BLE strength, good coordination,  and good balance. Skilled PT services are not indicated at s time. This was discussed and agreed upon with patient. Discharge P.T.  -       Row Name 07/22/23 0725          Therapy Assessment/Plan (PT)    Criteria for Skilled Interventions Met (PT) no;no problems identified which require skilled intervention  -LS     Therapy Frequency (PT) evaluation only  -LS       Row Name 07/22/23 0725          Vital Signs    Pre Systolic BP Rehab 106  -LS     Pre Treatment Diastolic BP 61  -LS     Post Systolic BP Rehab 112  -LS     Post Treatment Diastolic BP 87  -LS     Pretreatment Heart Rate (beats/min) 65  -LS     Posttreatment Heart Rate (beats/min) 85  -LS     Pre SpO2 (%) 96  -LS     O2 Delivery Pre Treatment room air  -LS     Post SpO2 (%) 97  -LS     O2 Delivery Post Treatment room air  -LS     Pre Patient Position --  Seay's  -LS     Post Patient Position --  Seay's  -LS       Row Name 07/22/23 0725          Positioning and Restraints    Pre-Treatment Position in bed  -LS     Post Treatment Position bed  -LS     In Bed notified nsg;verolers  exit alarm deferred as pt is indep. exit alarm was no on when therapist entered room.  -LS               User Key  (r) = Recorded By, (t) = Taken By, (c) = Cosigned By      Initials Name Provider Type    LS Sarah Ann, PT Physical Therapist                   Outcome Measures       Row Name 07/22/23 0725          How much help from another person do you currently need...    Turning from your back to your side while in flat bed without using bedrails? 4  -LS     Moving from lying on back to sitting on the side of a flat bed without bedrails? 4  -LS     Moving to and from a bed to a chair (including a wheelchair)? 4  -LS     Standing up from a chair using your arms (e.g., wheelchair, bedside chair)? 4  -LS     Climbing 3-5 steps with a railing? 4  -LS     To walk in hospital room? 4  -LS     AM-PAC 6 Clicks Score (PT) 24  -LS     Highest level of mobility 8 --> Walked  250 feet or more  -       Row Name 07/22/23 0725          Modified [place] Scale    Pre-Stroke Modified [place] Scale 0 - No Symptoms at all.  -     Modified Isaias Scale 0 - No Symptoms at all.  -       Row Name 07/22/23 0725          Functional Assessment    Outcome Measure Options AM-PAC 6 Clicks Basic Mobility (PT);Modified [place]  -LS               User Key  (r) = Recorded By, (t) = Taken By, (c) = Cosigned By      Initials Name Provider Type    Sarah Valencia, PT Physical Therapist                  Physical Therapy Education       Title: PT OT SLP Therapies (Resolved)       Topic: Physical Therapy (Resolved)       Point: Precautions (Resolved)       Learning Progress Summary             Patient Acceptance, E, VU by  at 7/22/2023 0725    Comment: Benefits of activity                                         User Key       Initials Effective Dates Name Provider Type Discipline     07/11/23 -  Sarah Ann, PT Physical Therapist PT                  PT Recommendation and Plan     Plan of Care Reviewed With: patient  Outcome Evaluation: Pt is indep with mobility. She has equal BLE strength, good coordination, and good balance. Skilled PT services are not indicated at ths time. This was discussed and agreed upon with patient. Discharge P.T.     Time Calculation:   PT Evaluation Complexity  History, PT Evaluation Complexity: 1-2 personal factors and/or comorbidities  Examination of Body Systems (PT Eval Complexity): total of 4 or more elements  Clinical Presentation (PT Evaluation Complexity): stable  Clinical Decision Making (PT Evaluation Complexity): low complexity  Overall Complexity (PT Evaluation Complexity): low complexity     PT Charges       Row Name 07/22/23 0725             Time Calculation    Start Time 0725  -      PT Received On 07/22/23  -         Untimed Charges    PT Eval/Re-eval Minutes 35  -LS         Total Minutes    Untimed Charges Total Minutes 35  -LS       Total  Minutes 35  -LS                User Key  (r) = Recorded By, (t) = Taken By, (c) = Cosigned By      Initials Name Provider Type    Sarah Valencia, PT Physical Therapist                  Therapy Charges for Today       Code Description Service Date Service Provider Modifiers Qty    57248428437 HC PT EVAL LOW COMPLEXITY 3 7/22/2023 Sarah Ann, PT GP 1            PT G-Codes  Outcome Measure Options: AM-PAC 6 Clicks Basic Mobility (PT), Modified [place]  AM-PAC 6 Clicks Score (PT): 24  Modified [place] Scale: 0 - No Symptoms at all.    PT Discharge Summary  Anticipated Discharge Disposition (PT): home  Reason for Discharge: Independent    Sarah Ann, PT  7/22/2023

## 2023-07-23 ENCOUNTER — APPOINTMENT (OUTPATIENT)
Dept: MRI IMAGING | Facility: HOSPITAL | Age: 53
End: 2023-07-23
Payer: COMMERCIAL

## 2023-07-23 LAB
BH CV ECHO MEAS - AO MAX PG: 6.8 MMHG
BH CV ECHO MEAS - AO MEAN PG: 4 MMHG
BH CV ECHO MEAS - AO ROOT DIAM: 2.3 CM
BH CV ECHO MEAS - AO V2 MAX: 130 CM/SEC
BH CV ECHO MEAS - AO V2 VTI: 25.8 CM
BH CV ECHO MEAS - AVA(I,D): 2.09 CM2
BH CV ECHO MEAS - EDV(CUBED): 59.3 ML
BH CV ECHO MEAS - EDV(MOD-SP2): 38.6 ML
BH CV ECHO MEAS - EDV(MOD-SP4): 47.2 ML
BH CV ECHO MEAS - EF(MOD-BP): 70.2 %
BH CV ECHO MEAS - EF(MOD-SP2): 57.8 %
BH CV ECHO MEAS - EF(MOD-SP4): 78.4 %
BH CV ECHO MEAS - ESV(CUBED): 19.7 ML
BH CV ECHO MEAS - ESV(MOD-SP2): 16.3 ML
BH CV ECHO MEAS - ESV(MOD-SP4): 10.2 ML
BH CV ECHO MEAS - FS: 30.8 %
BH CV ECHO MEAS - IVS/LVPW: 1 CM
BH CV ECHO MEAS - IVSD: 0.8 CM
BH CV ECHO MEAS - LA DIMENSION: 2.7 CM
BH CV ECHO MEAS - LAT PEAK E' VEL: 19.7 CM/SEC
BH CV ECHO MEAS - LV MASS(C)D: 89.7 GRAMS
BH CV ECHO MEAS - LV MAX PG: 5 MMHG
BH CV ECHO MEAS - LV MEAN PG: 2 MMHG
BH CV ECHO MEAS - LV V1 MAX: 112 CM/SEC
BH CV ECHO MEAS - LV V1 VTI: 21.2 CM
BH CV ECHO MEAS - LVIDD: 3.9 CM
BH CV ECHO MEAS - LVIDS: 2.7 CM
BH CV ECHO MEAS - LVOT AREA: 2.5 CM2
BH CV ECHO MEAS - LVOT DIAM: 1.8 CM
BH CV ECHO MEAS - LVPWD: 0.8 CM
BH CV ECHO MEAS - MED PEAK E' VEL: 11.3 CM/SEC
BH CV ECHO MEAS - MV A MAX VEL: 65 CM/SEC
BH CV ECHO MEAS - MV DEC SLOPE: 340 CM/SEC2
BH CV ECHO MEAS - MV DEC TIME: 0.22 MSEC
BH CV ECHO MEAS - MV E MAX VEL: 69.2 CM/SEC
BH CV ECHO MEAS - MV E/A: 1.06
BH CV ECHO MEAS - MV MAX PG: 2.7 MMHG
BH CV ECHO MEAS - MV MEAN PG: 1 MMHG
BH CV ECHO MEAS - MV P1/2T: 80.1 MSEC
BH CV ECHO MEAS - MV V2 VTI: 27.2 CM
BH CV ECHO MEAS - MVA(P1/2T): 2.7 CM2
BH CV ECHO MEAS - MVA(VTI): 1.98 CM2
BH CV ECHO MEAS - PA ACC TIME: 0.17 SEC
BH CV ECHO MEAS - RAP SYSTOLE: 3 MMHG
BH CV ECHO MEAS - RVSP: 18 MMHG
BH CV ECHO MEAS - SV(LVOT): 53.9 ML
BH CV ECHO MEAS - SV(MOD-SP2): 22.3 ML
BH CV ECHO MEAS - SV(MOD-SP4): 37 ML
BH CV ECHO MEAS - TAPSE (>1.6): 2.5 CM
BH CV ECHO MEAS - TR MAX PG: 15.1 MMHG
BH CV ECHO MEAS - TR MAX VEL: 194 CM/SEC
BH CV ECHO MEASUREMENTS AVERAGE E/E' RATIO: 4.46
BH CV ECHO SHUNT ASSESSMENT PERFORMED (HIDDEN SCRIPTING): 1
BH CV XLRA - RV BASE: 2.6 CM
BH CV XLRA - RV LENGTH: 7.1 CM
BH CV XLRA - RV MID: 2.8 CM
BH CV XLRA - TDI S': 14.1 CM/SEC
FOLATE SERPL-MCNC: >20 NG/ML (ref 4.78–24.2)
LEFT ATRIUM VOLUME INDEX: 10.1 ML/M2
LV EF 2D ECHO EST: 70 %
QT INTERVAL: 408 MS
QTC INTERVAL: 431 MS
VIT B12 BLD-MCNC: 951 PG/ML (ref 211–946)

## 2023-07-23 PROCEDURE — G0378 HOSPITAL OBSERVATION PER HR: HCPCS

## 2023-07-23 PROCEDURE — 82746 ASSAY OF FOLIC ACID SERUM: CPT | Performed by: PSYCHIATRY & NEUROLOGY

## 2023-07-23 PROCEDURE — 25010000002 LORAZEPAM PER 2 MG: Performed by: PSYCHIATRY & NEUROLOGY

## 2023-07-23 PROCEDURE — 99232 SBSQ HOSP IP/OBS MODERATE 35: CPT | Performed by: INTERNAL MEDICINE

## 2023-07-23 PROCEDURE — 86038 ANTINUCLEAR ANTIBODIES: CPT | Performed by: PSYCHIATRY & NEUROLOGY

## 2023-07-23 PROCEDURE — 99214 OFFICE O/P EST MOD 30 MIN: CPT | Performed by: PSYCHIATRY & NEUROLOGY

## 2023-07-23 PROCEDURE — 72156 MRI NECK SPINE W/O & W/DYE: CPT

## 2023-07-23 PROCEDURE — 96374 THER/PROPH/DIAG INJ IV PUSH: CPT

## 2023-07-23 PROCEDURE — A9577 INJ MULTIHANCE: HCPCS | Performed by: INTERNAL MEDICINE

## 2023-07-23 PROCEDURE — 0 GADOBENATE DIMEGLUMINE 529 MG/ML SOLUTION: Performed by: INTERNAL MEDICINE

## 2023-07-23 PROCEDURE — 92523 SPEECH SOUND LANG COMPREHEN: CPT

## 2023-07-23 PROCEDURE — 70552 MRI BRAIN STEM W/DYE: CPT

## 2023-07-23 PROCEDURE — 82607 VITAMIN B-12: CPT | Performed by: PSYCHIATRY & NEUROLOGY

## 2023-07-23 RX ORDER — LORAZEPAM 2 MG/ML
1 INJECTION INTRAMUSCULAR EVERY 4 HOURS PRN
Status: DISCONTINUED | OUTPATIENT
Start: 2023-07-23 | End: 2023-07-25 | Stop reason: HOSPADM

## 2023-07-23 RX ADMIN — Medication 10 ML: at 09:21

## 2023-07-23 RX ADMIN — LORAZEPAM 1 MG: 2 INJECTION INTRAMUSCULAR; INTRAVENOUS at 15:53

## 2023-07-23 RX ADMIN — CLOPIDOGREL BISULFATE 75 MG: 75 TABLET ORAL at 09:20

## 2023-07-23 RX ADMIN — Medication 10 ML: at 20:08

## 2023-07-23 RX ADMIN — GADOBENATE DIMEGLUMINE 10 ML: 529 INJECTION, SOLUTION INTRAVENOUS at 12:04

## 2023-07-23 RX ADMIN — ATORVASTATIN CALCIUM 80 MG: 40 TABLET, FILM COATED ORAL at 20:07

## 2023-07-23 RX ADMIN — ASPIRIN 81 MG: 81 TABLET, CHEWABLE ORAL at 09:20

## 2023-07-23 NOTE — NURSING NOTE
"Pt notified nurse with complaints of right side numbness and tingling. Pt states \"lasting a minute.\" Pt awaiting MRI. Vital signs stable. Notified Dr thomson on call hospitalist, states to call stroke team. Stroke team notified, MRI was ordered stat. Notified MD on call for Neuro of MRI completed due to Dr. Bradshaw request. Will continue to monitor.   "

## 2023-07-23 NOTE — THERAPY EVALUATION
Acute Care - Speech Language Pathology Initial Evaluation  [facility]  Cognitive-Communication Evaluation       Patient Name: Anastasiya Weaver  : 1970  MRN: [id number]  Today's Date: 2023               Admit Date: 2023     Visit Dx:    ICD-10-CM ICD-9-CM   1. Right facial numbness  R20.0 782.0   2. Numbness and tingling of right arm and leg  R20.0 782.0    R20.2    3. Dizziness  R42 780.4   4. Current long-term use of postmenopausal hormone replacement therapy  Z79.890 V07.4   5. Family history of stroke  Z82.3 V17.1     Patient Active Problem List   Diagnosis    Numbness     History reviewed. No pertinent past medical history.  Past Surgical History:   Procedure Laterality Date    BREAST EXCISIONAL BIOPSY Left     NASAL SINUS SURGERY         SLP Recommendation and Plan  SLP Diagnosis: functional speech/language skills, functional cognitive-linguistic skills (23 1305)  SLP Diagnosis Comments: Speech, language, and cognitive communciation abilities are grossly functional per this eval. Pt reports current level of fx is c/w baseline. No acute needs @ this time, SLP will sign off for SLC. Please reconsult if further concerns arise (23 1305)        SLC Criteria for Skilled Therapy Interventions Met: no problems identified which require skilled intervention (23 1305)  Anticipated Discharge Disposition (SLP): home (23 1305)                                             SLP EVALUATION (last 72 hours)       SLP SLC Evaluation       Row Name 23 1305                   Communication Assessment/Intervention    Document Type evaluation  -MH        Subjective Information no complaints  -        Patient Observations alert;cooperative  -MH        Patient/Family/Caregiver Comments/Observations Family present  -MH        Patient Effort good  -MH        Symptoms Noted During/After Treatment none  -MH           General Information    Patient Profile Reviewed yes  -MH         Pertinent History Of Current Problem Adm with c/o R sided numbness. MRI negative for acute intracranial abnormalities  -        Precautions/Limitations, Vision WFL;for purposes of eval  -        Precautions/Limitations, Hearing WFL;for purposes of eval  -        Prior Level of Function-Communication [facility]        Plans/Goals Discussed with patient;family;agreed upon  -        Barriers to Rehab none identified  -        Patient's Goals for Discharge patient did not state  -        Family Goals for Discharge family did not state  -           Pain    Additional Documentation Pain Scale: FACES Pre/Post-Treatment (Group)  -           Pain Scale: FACES Pre/Post-Treatment    Pain: FACES Scale, Pretreatment 0-->no hurt  -        Posttreatment Pain Rating 0-->no hurt  -           Comprehension Assessment/Intervention    Comprehension Assessment/Intervention Auditory Comprehension;Reading Comprehension  -           Auditory Comprehension Assessment/Intervention    Auditory Comprehension (Communication) [facility]  -           Reading Comprehension Assessment/Intervention    Reading Comprehension (Communication) [facility]  -           Expression Assessment/Intervention    Expression Assessment/Intervention verbal expression;graphic expression  -           Verbal Expression Assessment/Intervention    Verbal Expression [facility]  -           Graphic Expression Assessment/Intervention    Graphic Expression [facility]  -           Motor Speech Assessment/Intervention    Motor Speech Function [facility]           Cognitive Assessment Intervention- SLP    Cognitive Function (Cognition) [facility]           SLP Evaluation Clinical Impressions    SLP Diagnosis functional speech/language skills;functional cognitive-linguistic skills  -        SLP Diagnosis Comments Speech, language, and cognitive communciation abilities are grossly functional per this eval. Pt reports current level of fx is c/w baseline. No acute needs @ this  time, SLP will sign off for SLC. Please reconsult if further concerns arise  -MH        Rehab Potential/Prognosis good  -MH        SLC Criteria for Skilled Therapy Interventions Met no problems identified which require skilled intervention  -MH        Functional Impact no impact on function  -MH           Recommendations    Therapy Frequency (SLP SLC) evaluation only  -MH        Anticipated Discharge Disposition (SLP) home  -MH                  User Key  (r) = Recorded By, (t) = Taken By, (c) = Cosigned By      Initials Name Effective Dates    eYimi Craig MS CCC-SLP 05/12/23 -                        EDUCATION  The patient has been educated in the following areas:     Cognitive Impairment Communication Impairment.                      Time Calculation:      Time Calculation- SLP       Row Name 07/23/23 1334             Time Calculation- SLP    SLP Start Time 1305  -      SLP Received On 07/23/23  -         Untimed Charges    31786-UV Eval Speech and Production w/ Language Minutes 45  -MH         Total Minutes    Untimed Charges Total Minutes 45  -MH       Total Minutes 45  -MH                User Key  (r) = Recorded By, (t) = Taken By, (c) = Cosigned By      Initials Name Provider Type     Yeimi Ortiz MS CCC-SLP Speech and Language Pathologist                    Therapy Charges for Today       Code Description Service Date Service Provider Modifiers Qty    [id number] HC ST EVAL SPEECH AND PROD W LANG  3 7/23/2023 Yeimi Ortiz MS CCC-SLP GN 1                       Yeimi Ortiz MS CCC-LATISHA  7/23/2023

## 2023-07-23 NOTE — PROGRESS NOTES
Neurology Note    Patient:  Anastasiya Weaver    YOB: 1970    REFERRING PHYSICIAN:  Dr. Sorenson    CHIEF COMPLAINT:    Right sided numbness    HISTORY OF PRESENT ILLNESS:   The patient reports several more episodes of brief right sided f/a/l numbness a/w dizziness and some anxiety, vitals stable. She was started on Plavix last night.    Past Medical History:  History reviewed. No pertinent past medical history.    Past Surgical History:  Past Surgical History:   Procedure Laterality Date    BREAST EXCISIONAL BIOPSY Left 2008    NASAL SINUS SURGERY         Social History:   Social History     Socioeconomic History    Marital status:    Tobacco Use    Smoking status: Never    Smokeless tobacco: Never   Vaping Use    Vaping Use: Never used   Substance and Sexual Activity    Alcohol use: Not Currently    Drug use: Never    Sexual activity: Defer        Family History:   Family History   Problem Relation Age of Onset    Stroke Father     Breast cancer Sister 48    Transient ischemic attack Sister     Ovarian cancer Neg Hx        Medications Prior to Admission:    Prior to Admission medications    Medication Sig Start Date End Date Taking? Authorizing Provider   PROGESTERONE PO drospiren-e.estrad-l.mefol 3 mg-0.02 mg-0.451 mg(24)/0.451 mg(4)tablet   Yes Provider, Historical, MD       Allergies:  Cefdinir, Erythromycin, Penicillins, and Sulfa antibiotics      Review of system  Review of Systems   Neurological:  Positive for dizziness and numbness.   All other systems reviewed and are negative.    Vitals:    07/23/23 0801   BP: 125/80   Pulse: 62   Resp: 18   Temp: 97.9 °F (36.6 °C)   SpO2: 96%       Physical exam  Physical Exam  Eyes:      Extraocular Movements: Extraocular movements intact.      Pupils: Pupils are equal, round, and reactive to light.   Cardiovascular:      Rate and Rhythm: Normal rate and regular rhythm.   Pulmonary:      Effort: Pulmonary effort is normal.   Neurological:       General: No focal deficit present.      Comments: Speech clear, no facial droop, no limb drift.         Lab Results   Component Value Date    WBC 6.31 07/21/2023    HGB 14.3 07/21/2023    HGB 14.6 07/21/2023    HCT 42.5 07/21/2023    HCT 43 07/21/2023    MCV 92.6 07/21/2023     07/21/2023     Lab Results   Component Value Date    GLUCOSE 88 07/21/2023    BUN 8 07/21/2023    CREATININE 0.64 07/21/2023    CREATININE 0.60 07/21/2023    BCR 12.5 07/21/2023    CO2 25.0 07/21/2023    CALCIUM 9.8 07/21/2023    ALBUMIN 4.9 07/21/2023    AST 27 07/21/2023    ALT 20 07/21/2023     C-Reactive Protein  0.00 - 0.50 mg/dL <0.30     Sed Rate  0 - 30 mm/hr 6   ECG 12 Lead Other; neuro changes (Order [id number])  Order-Level Documents:    Scan on 7/21/2023 1939 by [place], [place]: ECG 12-LEAD         Author: -- Service: -- Author Type: --   Filed: Date of Service: Creation Time:   Status: (Other)   Test Reason : Other~  Blood Pressure :   */*   mmHG  Vent. Rate :  67 BPM     Atrial Rate :  67 BPM     P-R Int : 154 ms          QRS Dur :  84 ms      QT Int : 408 ms       P-R-T Axes :  70  49  46 degrees     QTc Int : 431 ms     Normal sinus rhythm  Normal ECG  No previous ECGs available     Referred By:            Confirmed By:         Radiological Studies:   Adult Transthoracic Echo Complete W/ Cont if Necessary Per Protocol (With Agitated Saline)    Result Date: 7/23/2023    Left ventricular systolic function is normal. Estimated left ventricular EF = 70%   The cardiac valves are anatomically and functionally normal.   Estimated right ventricular systolic pressure from tricuspid regurgitation is normal (<35 mmHg).   Saline test results are negative.     Narrative & Impression      MRI CERVICAL SPINE W WO CONTRAST     Date of Exam: 7/23/2023 12:04 PM EDT     Indication: transient right face, arm and leg numbness.     Comparison: None available.     Technique:  Routine multiplanar/multisequence sequence images of the  cervical spine were obtained before and after the uneventful administration of 10 mL Multihance.       FINDINGS:  Cervical spine alignment appears unremarkable. The craniocervical and atlantoaxial relationships are normal. Visualized marrow signal is unremarkable. Vertebral body heights are normal. Cervical discs are mildly desiccated. No cervical cord signal   abnormality is seen. The paraspinal soft tissues appear unremarkable. Limited visualization of the intracranial structures is unremarkable.     C2-C3: No significant spinal canal or neural foraminal stenosis.     C3-C4: No significant spinal canal or neural foraminal stenosis.     C4-C5: Left greater than right facet arthropathy contributes to mild left neural foraminal narrowing. Spinal canal and right neural foramen appear patent.     C5-C6: No significant spinal canal or neural foraminal stenosis.     C6-C7: Central annular fissure with broad-based disc bulge and bilateral uncovertebral and facet arthropathy. There is mild to moderate bilateral neural foraminal narrowing. Mild effacement of the anterior thecal sac. Spinal canal is not significantly   narrowed.     C7-T1: No significant spinal canal or neural foraminal stenosis.     IMPRESSION:  1.Mild cervical spondylosis with bilateral neural foraminal narrowing at C6-C7 and mild left neural foraminal narrowing at C4-C5. No significant central spinal canal stenosis is identified within the cervical spine.  2.No abnormal contrast enhancement is identified.  3.Please see above for additional details.     Electronically Signed: Ryan Wong    7/23/2023 12:38 PM EDT    Workstation ID: [id number]   MRI BRAIN W CONTRAST     Date of Exam: 7/23/2023 12:02 PM EDT     Indication: transient right face, arm and leg numbness.     Comparison: 7/22/2023 and prior     Technique:  Routine multiplanar/multisequence sequence images of the brain were obtained after the uneventful administration of 10 mL Multihance.           Findings:  Postcontrast imaging demonstrate no suspicious areas of enhancement within the brain parenchyma.     Paranasal sinus disease again noted     IMPRESSION:  Impression:  No abnormal postcontrast enhancement   CT Angiogram Neck    Result Date: 7/21/2023  CT ANGIOGRAM NECK, CT ANGIOGRAM HEAD W AI ANALYSIS OF LVO Date of Exam: 7/21/2023 7:58 PM EDT Indication: right hand/foot/face tingling. Comparison: None available. Technique: CTA of the neck was performed before and after the uneventful intravenous administration of 115 mL Isovue-370. Reconstructed coronal and sagittal images were also obtained. In addition, a 3-D volume rendered image was created for interpretation. Automated exposure control and iterative reconstruction methods were used. Findings: CTA NECK: There is no evidence of significant common, internal, or external carotid artery stenosis or significant carotid plaque in the neck. Vertebral arteries are codominant, uniform and normal in appearance along the length. There is no evidence of significant incidental soft tissue neck pathology. Included lung apices and superior mediastinum appear grossly normal.     Neck CTA appears within normal limits. CTA HEAD: Distal vertebral arteries, basilar artery and posterior cerebral arteries appear within normal limits. There is mild calcification of the supraclinoid right and left ICA. No significant intracranial ICA stenosis is seen. Anterior and middle cerebral arteries and proximal branches appear within normal limits. No significant stenosis is seen. The major dural venous sinuses appear to opacify normally with contrast. Impression: CTA of the head appears within normal limits. Electronically Signed: Tru Hernandes  7/21/2023 8:41 PM EDT  Workstation ID: [id number]    MRI Brain Without Contrast    Result Date: 7/22/2023  MRI BRAIN WO CONTRAST Date of Exam: 7/22/2023 8:00 PM EDT Indication: Stroke, follow up.  Comparison: 7/21/2023. Technique:  Routine  multiplanar/multisequence sequence images of the brain were obtained without contrast administration. Findings: There is no diffusion restriction to suggest acute infarct. There is no evidence of acute or chronic intracranial hemorrhage. No mass effect or midline shift. No abnormal extra-axial collections. No significant FLAIR signal changes identified. No significant signal abnormality identified. The major vascular flow voids appear intact. The basal ganglia, brainstem and cerebellum appear within normal limits. Calvarial and superficial soft tissue signal is within normal limits. Orbits appear unremarkable. The paranasal sinuses and the mastoid air cells appear well aerated. Patchy mucosal thickening is present within the left sphenoid sinus and the posterior ethmoid air cells on the left with probable occlusion of the left sphenoethmoidal ostia. No air-fluid levels identified. Midline structures are intact.     Impression: Unremarkable brain MRI. No acute process identified. No evidence of recent or acute ischemia. No significant signal abnormality. Left-sided paranasal sinus disease as described above with no air-fluid levels identified to suggest acute sinusitis. Electronically Signed: Sherley Zuluaga  7/22/2023 8:22 PM EDT  Workstation ID: [id number]    XR Chest 1 View    Result Date: 7/21/2023  XR CHEST 1 VW Date of Exam: 7/21/2023 8:33 PM EDT Indication: neuro changes, numbness/tingling Comparison: None available. Findings: The heart, mediastinum and pulmonary vasculature appear within normal limits. Lungs appear well expanded to mildly hyperinflated and clear of active disease. Slight coarsening of the pulmonary interstitial markings is likely chronic. A few granulomatous calcifications are seen in the right suprahilar region.     Impression: No evidence of active chest disease. Electronically Signed: Tru Hernandes  7/21/2023 8:59 PM EDT  Workstation ID: [id number]    CT Head Without Contrast Stroke Protocol    Result  Date: 7/21/2023  CT HEAD WO CONTRAST STROKE PROTOCOL Date of Exam: 7/21/2023 7:55 PM EDT Indication: Transient ischemic attack (TIA). Comparison: None available. Technique: Axial CT images were obtained of the head without contrast administration.  Reconstructed coronal images were also obtained. Automated exposure control and iterative construction methods were used. Scan Time: 2000 Results discussed with stroke team nurse navigator at 2003, 7/21/2023. Findings: The calvarium appears intact. The left sphenoid sinus, and a couple of adjacent ethmoid sinuses are fluid opacified but the remaining paranasal sinuses and mastoids appear clear. Middle ear spaces appear clear. No gross abnormalities are seen of the orbits. The brain appears within normal limits. No evidence of hemorrhage, contusion, or edema is seen. There is no evidence of mass or mass effect, hydrocephalus, or abnormal extra-axial collection. No unusual focal or generalized brain atrophy is seen. No significant white matter changes are appreciated.     Impression: 1. No evidence of acute intracranial disease. 2. Incidentally noted left sphenoid and ethmoid sinus disease. Electronically Signed: Tru Hernandes  7/21/2023 8:08 PM EDT  Workstation ID: [id number]    CT Angiogram Head w AI Analysis of LVO    Result Date: 7/21/2023  CT ANGIOGRAM NECK, CT ANGIOGRAM HEAD W AI ANALYSIS OF LVO Date of Exam: 7/21/2023 7:58 PM EDT Indication: right hand/foot/face tingling. Comparison: None available. Technique: CTA of the neck was performed before and after the uneventful intravenous administration of 115 mL Isovue-370. Reconstructed coronal and sagittal images were also obtained. In addition, a 3-D volume rendered image was created for interpretation. Automated exposure control and iterative reconstruction methods were used. Findings: CTA NECK: There is no evidence of significant common, internal, or external carotid artery stenosis or significant carotid plaque in the  neck. Vertebral arteries are codominant, uniform and normal in appearance along the length. There is no evidence of significant incidental soft tissue neck pathology. Included lung apices and superior mediastinum appear grossly normal.     Neck CTA appears within normal limits. CTA HEAD: Distal vertebral arteries, basilar artery and posterior cerebral arteries appear within normal limits. There is mild calcification of the supraclinoid right and left ICA. No significant intracranial ICA stenosis is seen. Anterior and middle cerebral arteries and proximal branches appear within normal limits. No significant stenosis is seen. The major dural venous sinuses appear to opacify normally with contrast. Impression: CTA of the head appears within normal limits. Electronically Signed: Tru Hernandes  7/21/2023 8:41 PM EDT  Workstation ID: [id number]    CT CEREBRAL PERFUSION WITH & WITHOUT CONTRAST    Result Date: 7/21/2023  CT CEREBRAL PERFUSION W WO CONTRAST Date of Exam: 7/21/2023 7:58 PM EDT Indication: Neuro deficit, acute, stroke suspected.  Comparison: None available. Technique: Axial CT images of the brain were obtained prior to and after the administration of 115 mL Isovue-370. Core blood volume, core blood flow, mean transit time, and Tmax images were obtained utilizing the Rapid software protocol. A limited CT angiogram of the head was also performed to measure the blood vessel density. The radiation dose reduction device was turned on for each scan per the ALARA (As Low as Reasonably Achievable) protocol. Findings: Rapid analysis is negative with no evidence of the brain showing cerebral blood flow less than 30% or T-max greater than 6 seconds. Individual perfusion maps show no significant asymmetry of blood flow to suggest focal ischemia or infarct.     Impression: Negative perfusion scan. Electronically Signed: Tru Hernandes  7/21/2023 8:29 PM EDT  Workstation ID: [id number]       During this visit the following were  done:  Labs Reviewed [x]    Labs Ordered []    Radiology Reports Reviewed [x]    Radiology Ordered []    EKG, echo, and/or stress test reviewed [x]    EEG results reviewed  []    EEG reviewed and interpreted per myself   []    Discussed case with neurointerventionalist or neuroradiologist []    Referring Provider Records Reviewed []    ER Records Reviewed []    Hospital Records Reviewed []    History Obtained From Family []    Radiological images view and Interpreted per myself [x]    Case Discussed with referring provider [x]     Decision to obtain and request outside records  []        Assessment and Plan     Recurrent paresthesias a/w dizziness and anxiety, extensive work-up negative so far. Considerations include sensory seizures, panic attacks.  Normal CTH/CTP/CTA, MRI brain and c.spine w/wo, CRP/ESR.   - Continue observation on telemetry.   - Continue DAPT and statin.   - EEG.   - Ativan prn recurrent symptoms.              Electronically signed by Jeet Bradshaw MD on 7/23/2023 at 12:31 EDT

## 2023-07-23 NOTE — PROGRESS NOTES
"    [facility] Medicine Services  PROGRESS NOTE    Patient Name: Anastasiya Weaver  : 1970  MRN: [id number]    Date of Admission: 2023  Primary Care Physician: Pilar Street MD    Subjective   Subjective     CC:  numbness    HPI:  Several additional episodes of right face, arm and leg numbness yesterday afternoon and evening.      Episodes last from seconds up to one minute long    Sensation of \"pins and needles\"    Right arm heavy during longest episode    Facial involvement of lower 2/3 only, not in forehead    Denies vision changes during episode    Denies chest pain or palpitations    ROS:  Gen: no fever  HEENT: no sinus pressure  CV: no chest pain or palpiations    Objective   Objective     Vital Signs:   Temp:  [97.9 °F (36.6 °C)-98.3 °F (36.8 °C)] 97.9 °F (36.6 °C)  Heart Rate:  [62-67] 62  Resp:  [17-18] 18  BP: (115-126)/(75-83) 125/80     Physical Exam:  Constitutional - no acute distress, nontoxic, in bed  HEENT-NCAT, mucous membranes moist  CV-RRR  Resp-CTAB  Abd-soft, nontender, nondistended, normoactive bowel sounds  Ext-No lower extremity cyanosis, clubbing or edema bilaterally  Neuro-alert, speech clear, moves all extremities, face symmetric   Psych-normal affect   Skin- No rash on exposed UE or LE bilaterally        Results Reviewed:  LAB RESULTS:      Lab 23  0556 23   WBC  --  6.31   HEMOGLOBIN  --  14.3   HEMOGLOBIN, POC  --  14.6   HEMATOCRIT  --  42.5   HEMATOCRIT POC  --  43   PLATELETS  --  341   NEUTROS ABS  --  3.32   IMMATURE GRANS (ABS)  --  0.01   LYMPHS ABS  --  2.46   MONOS ABS  --  0.46   EOS ABS  --  0.03   MCV  --  92.6   SED RATE 6  --    CRP <0.30  --          Lab 23  0556 23   SODIUM  --  141   POTASSIUM  --  3.8   CHLORIDE  --  103   CO2  --  25.0   ANION GAP  --  13.0   BUN  --  8   CREATININE  --  0.60  0.64   EGFR  --  105.2  106.8   GLUCOSE  --  88   CALCIUM  --  9.8   HEMOGLOBIN A1C 5.00  --  "   TSH  --  0.445         Lab 07/21/23 2001   TOTAL PROTEIN 7.5   ALBUMIN 4.9   GLOBULIN 2.6   ALT (SGPT) 20   AST (SGOT) 27   BILIRUBIN 0.7   ALK PHOS 76         Lab 07/21/23 2001   HSTROP T 10*         Lab 07/22/23  0556   CHOLESTEROL 146   LDL CHOL 83   HDL CHOL 52   TRIGLYCERIDES 54             Brief Urine Lab Results       None            Microbiology Results Abnormal       None            Adult Transthoracic Echo Complete W/ Cont if Necessary Per Protocol (With Agitated Saline)    Result Date: 7/23/2023    Left ventricular systolic function is normal. Estimated left ventricular EF = 70%   The cardiac valves are anatomically and functionally normal.   Estimated right ventricular systolic pressure from tricuspid regurgitation is normal (<35 mmHg).   Saline test results are negative.     CT Angiogram Neck    Result Date: 7/21/2023  CT ANGIOGRAM NECK, CT ANGIOGRAM HEAD W AI ANALYSIS OF LVO Date of Exam: 7/21/2023 7:58 PM EDT Indication: right hand/foot/face tingling. Comparison: None available. Technique: CTA of the neck was performed before and after the uneventful intravenous administration of 115 mL Isovue-370. Reconstructed coronal and sagittal images were also obtained. In addition, a 3-D volume rendered image was created for interpretation. Automated exposure control and iterative reconstruction methods were used. Findings: CTA NECK: There is no evidence of significant common, internal, or external carotid artery stenosis or significant carotid plaque in the neck. Vertebral arteries are codominant, uniform and normal in appearance along the length. There is no evidence of significant incidental soft tissue neck pathology. Included lung apices and superior mediastinum appear grossly normal.     Impression: Neck CTA appears within normal limits. CTA HEAD: Distal vertebral arteries, basilar artery and posterior cerebral arteries appear within normal limits. There is mild calcification of the supraclinoid right  and left ICA. No significant intracranial ICA stenosis is seen. Anterior and middle cerebral arteries and proximal branches appear within normal limits. No significant stenosis is seen. The major dural venous sinuses appear to opacify normally with contrast. Impression: CTA of the head appears within normal limits. Electronically Signed: Tru Hernandes  7/21/2023 8:41 PM EDT  Workstation ID: [id number]    MRI Brain Without Contrast    Result Date: 7/22/2023  MRI BRAIN WO CONTRAST Date of Exam: 7/22/2023 8:00 PM EDT Indication: Stroke, follow up.  Comparison: 7/21/2023. Technique:  Routine multiplanar/multisequence sequence images of the brain were obtained without contrast administration. Findings: There is no diffusion restriction to suggest acute infarct. There is no evidence of acute or chronic intracranial hemorrhage. No mass effect or midline shift. No abnormal extra-axial collections. No significant FLAIR signal changes identified. No significant signal abnormality identified. The major vascular flow voids appear intact. The basal ganglia, brainstem and cerebellum appear within normal limits. Calvarial and superficial soft tissue signal is within normal limits. Orbits appear unremarkable. The paranasal sinuses and the mastoid air cells appear well aerated. Patchy mucosal thickening is present within the left sphenoid sinus and the posterior ethmoid air cells on the left with probable occlusion of the left sphenoethmoidal ostia. No air-fluid levels identified. Midline structures are intact.     Impression: Impression: Unremarkable brain MRI. No acute process identified. No evidence of recent or acute ischemia. No significant signal abnormality. Left-sided paranasal sinus disease as described above with no air-fluid levels identified to suggest acute sinusitis. Electronically Signed: Sherley Zuluaga  7/22/2023 8:22 PM EDT  Workstation ID: [id number]    XR Chest 1 View    Result Date: 7/21/2023  XR CHEST 1 VW Date of Exam:  7/21/2023 8:33 PM EDT Indication: neuro changes, numbness/tingling Comparison: None available. Findings: The heart, mediastinum and pulmonary vasculature appear within normal limits. Lungs appear well expanded to mildly hyperinflated and clear of active disease. Slight coarsening of the pulmonary interstitial markings is likely chronic. A few granulomatous calcifications are seen in the right suprahilar region.     Impression: Impression: No evidence of active chest disease. Electronically Signed: Tru Hernandes  7/21/2023 8:59 PM EDT  Workstation ID: [id number]    CT Head Without Contrast Stroke Protocol    Result Date: 7/21/2023  CT HEAD WO CONTRAST STROKE PROTOCOL Date of Exam: 7/21/2023 7:55 PM EDT Indication: Transient ischemic attack (TIA). Comparison: None available. Technique: Axial CT images were obtained of the head without contrast administration.  Reconstructed coronal images were also obtained. Automated exposure control and iterative construction methods were used. Scan Time: 2000 Results discussed with stroke team nurse navigator at 2003, 7/21/2023. Findings: The calvarium appears intact. The left sphenoid sinus, and a couple of adjacent ethmoid sinuses are fluid opacified but the remaining paranasal sinuses and mastoids appear clear. Middle ear spaces appear clear. No gross abnormalities are seen of the orbits. The brain appears within normal limits. No evidence of hemorrhage, contusion, or edema is seen. There is no evidence of mass or mass effect, hydrocephalus, or abnormal extra-axial collection. No unusual focal or generalized brain atrophy is seen. No significant white matter changes are appreciated.     Impression: Impression: 1. No evidence of acute intracranial disease. 2. Incidentally noted left sphenoid and ethmoid sinus disease. Electronically Signed: Tru Hernaneds  7/21/2023 8:08 PM EDT  Workstation ID: [id number]    CT Angiogram Head w AI Analysis of LVO    Result Date: 7/21/2023  CT ANGIOGRAM NECK,  CT ANGIOGRAM HEAD W AI ANALYSIS OF LVO Date of Exam: 7/21/2023 7:58 PM EDT Indication: right hand/foot/face tingling. Comparison: None available. Technique: CTA of the neck was performed before and after the uneventful intravenous administration of 115 mL Isovue-370. Reconstructed coronal and sagittal images were also obtained. In addition, a 3-D volume rendered image was created for interpretation. Automated exposure control and iterative reconstruction methods were used. Findings: CTA NECK: There is no evidence of significant common, internal, or external carotid artery stenosis or significant carotid plaque in the neck. Vertebral arteries are codominant, uniform and normal in appearance along the length. There is no evidence of significant incidental soft tissue neck pathology. Included lung apices and superior mediastinum appear grossly normal.     Impression: Neck CTA appears within normal limits. CTA HEAD: Distal vertebral arteries, basilar artery and posterior cerebral arteries appear within normal limits. There is mild calcification of the supraclinoid right and left ICA. No significant intracranial ICA stenosis is seen. Anterior and middle cerebral arteries and proximal branches appear within normal limits. No significant stenosis is seen. The major dural venous sinuses appear to opacify normally with contrast. Impression: CTA of the head appears within normal limits. Electronically Signed: Tru Hernandes  7/21/2023 8:41 PM EDT  Workstation ID: [id number]    CT CEREBRAL PERFUSION WITH & WITHOUT CONTRAST    Result Date: 7/21/2023  CT CEREBRAL PERFUSION W WO CONTRAST Date of Exam: 7/21/2023 7:58 PM EDT Indication: Neuro deficit, acute, stroke suspected.  Comparison: None available. Technique: Axial CT images of the brain were obtained prior to and after the administration of 115 mL Isovue-370. Core blood volume, core blood flow, mean transit time, and Tmax images were obtained utilizing the Rapid software protocol.  A limited CT angiogram of the head was also performed to measure the blood vessel density. The radiation dose reduction device was turned on for each scan per the ALARA (As Low as Reasonably Achievable) protocol. Findings: Rapid analysis is negative with no evidence of the brain showing cerebral blood flow less than 30% or T-max greater than 6 seconds. Individual perfusion maps show no significant asymmetry of blood flow to suggest focal ischemia or infarct.     Impression: Impression: Negative perfusion scan. Electronically Signed: Tru Hernandes  7/21/2023 8:29 PM EDT  Workstation ID: [id number]     Results for orders placed during the hospital encounter of 07/21/23    Adult Transthoracic Echo Complete W/ Cont if Necessary Per Protocol (With Agitated Saline)    Interpretation Summary    Left ventricular systolic function is normal. Estimated left ventricular EF = 70%    The cardiac valves are anatomically and functionally normal.    Estimated right ventricular systolic pressure from tricuspid regurgitation is normal (<35 mmHg).    Saline test results are negative.      Current medications:  Scheduled Meds:aspirin, 81 mg, Oral, Daily   Or  aspirin, 300 mg, Rectal, Daily  atorvastatin, 80 mg, Oral, Nightly  clopidogrel, 75 mg, Oral, Daily  sodium chloride, 10 mL, Intravenous, Q12H      Continuous Infusions:   PRN Meds:.  sodium chloride    Insert Peripheral IV **AND** sodium chloride    sodium chloride    sodium chloride    Assessment & Plan   Assessment & Plan     Active Hospital Problems    Diagnosis  POA    Numbness [R20.0]  Yes      Resolved Hospital Problems   No resolved problems to display.        Brief Hospital Course to date:  Anastasiya Weaver is a 53 y.o. female with limited PMHx presents with right sided numbness    Right sided numbness  - recurrent symptoms yesterday  - denies history of migraines  - no history of tobacco abuse or DMII (A1c 5.0)  - CTA head and neck negative for stenosis or thrombus  - CT  head unremarkable (except for possible sinusitis)  - EKG NSR  - LDL 83  - echo showed normal EF, no shunt  - no acute pathology on non-contrast MRI brain  - sed rate 6  - check MRI brain and c-spine with/without contrast  - EEG pending  - discussed with Neurology Dr Bradshaw    Left sinus disease  - patient says she has a history of sinus disease on the left.  Discussed CT and MRI findings with the patient and reviewed the imaging with her.  - the patient thinks she had a bout of sinusitis last week -- did not take antibiotics, but she now thinks this has resolved.  - recommended flonase use on a regular basis.      Expected Discharge Location and Transportation:   Expected Discharge   Expected Discharge Date: 7/23/2023; Expected Discharge Time:      DVT prophylaxis:  Mechanical DVT prophylaxis orders are present.     AM-PAC 6 Clicks Score (PT): 24 (07/22/23 1000)    CODE STATUS:   Code Status and Medical Interventions:   Ordered at: 07/22/23 0002     Level Of Support Discussed With:    Patient     Code Status (Patient has no pulse and is not breathing):    CPR (Attempt to Resuscitate)     Medical Interventions (Patient has pulse or is breathing):    Full Support     Release to patient:    Routine Release       Dipesh Sorenson MD  07/23/23

## 2023-07-24 ENCOUNTER — APPOINTMENT (OUTPATIENT)
Dept: NEUROLOGY | Facility: HOSPITAL | Age: 53
End: 2023-07-24
Payer: COMMERCIAL

## 2023-07-24 PROCEDURE — 95714 VEEG EA 12-26 HR UNMNTR: CPT

## 2023-07-24 PROCEDURE — 95819 EEG AWAKE AND ASLEEP: CPT

## 2023-07-24 PROCEDURE — G0378 HOSPITAL OBSERVATION PER HR: HCPCS

## 2023-07-24 PROCEDURE — 95819 EEG AWAKE AND ASLEEP: CPT | Performed by: PSYCHIATRY & NEUROLOGY

## 2023-07-24 PROCEDURE — 99214 OFFICE O/P EST MOD 30 MIN: CPT | Performed by: PSYCHIATRY & NEUROLOGY

## 2023-07-24 PROCEDURE — 99232 SBSQ HOSP IP/OBS MODERATE 35: CPT | Performed by: INTERNAL MEDICINE

## 2023-07-24 PROCEDURE — 95720 EEG PHY/QHP EA INCR W/VEEG: CPT | Performed by: PSYCHIATRY & NEUROLOGY

## 2023-07-24 RX ORDER — ATORVASTATIN CALCIUM 10 MG/1
10 TABLET, FILM COATED ORAL NIGHTLY
Status: DISCONTINUED | OUTPATIENT
Start: 2023-07-24 | End: 2023-07-25 | Stop reason: HOSPADM

## 2023-07-24 RX ADMIN — Medication 10 ML: at 09:46

## 2023-07-24 RX ADMIN — CLOPIDOGREL BISULFATE 75 MG: 75 TABLET ORAL at 09:46

## 2023-07-24 RX ADMIN — ASPIRIN 81 MG: 81 TABLET, CHEWABLE ORAL at 09:46

## 2023-07-24 RX ADMIN — Medication 10 ML: at 20:02

## 2023-07-24 RX ADMIN — ATORVASTATIN CALCIUM 10 MG: 10 TABLET, FILM COATED ORAL at 20:02

## 2023-07-24 NOTE — CASE MANAGEMENT/SOCIAL WORK
Continued Stay Note   [place] [facility]     Patient Name: Anastasiya Weaver  MRN: [id number]  Today's Date: 7/24/2023    Admit Date: 7/21/2023    Plan: Home   Discharge Plan       Row Name 07/24/23 1641       Plan    Plan Home    Plan Comments chart reviewed. I met with Mrs Wadsworth to discuss discuss d/c plan. Her plan is to return home. She lives with . She was independent with all ADLs ,driving,working etc prior to admit. She has a PCP and insurance coverage. She uses no assistive equipment. Therapy evals noted, no need for further therapy.  No d/c needs identified at this timie, please advise if any arise    Final Discharge Disposition Code 01 - home or self-care                   Discharge Codes    No documentation.                 Expected Discharge Date and Time       Expected Discharge Date Expected Discharge Time    Jul 23, 2023               Sonja C Kellerman, RN

## 2023-07-24 NOTE — CONSULTS
Diabetes Education  Assessment/Teaching    Patient Name:  Anastasiya Weaver  YOB: 1970  MRN: [id number]  Admit Date:  7/21/2023    Chart reviewed per stroke protocol.  No hx DM noted, no DM home meds in chart, A1c WNL.     Will sign off.  Please re-consult if needs change.    Thank you for this referral.      Electronically signed by:  Rere Armenta RN  07/24/23 09:16 EDT

## 2023-07-24 NOTE — NURSING NOTE
Continuous EEG monitoring started at approximately 1530 .  At this time no events noted since monitoring began. Skin remains intact. Waiting a bed for transfer.

## 2023-07-24 NOTE — PROGRESS NOTES
[facility] Medicine Services  PROGRESS NOTE    Patient Name: Anastasiya Weaver  : 1970  MRN: [id number]    Date of Admission: 2023  Primary Care Physician: Pilar Street MD    Subjective   Subjective     CC:  numbness    HPI:  Several mild episodes of right sided warmth overnight. Denies weakness    ROS:  Gen: no fever  Pulm: no cough  Neuro: no headache    Objective   Objective     Vital Signs:   Temp:  [97.7 °F (36.5 °C)-97.9 °F (36.6 °C)] 97.9 °F (36.6 °C)  Heart Rate:  [72-80] 73  Resp:  [18] 18  BP: ()/(70-79) 110/72     Physical Exam:  Constitutional - no acute distress, nontoxic, in bed  HEENT-NCAT, mucous membranes moist  CV-RRR  Resp-CTAB  Abd-soft, nontender, nondistended, normoactive bowel sounds  Ext-No lower extremity cyanosis, clubbing or edema bilaterally  Neuro-alert, speech clear, moves all extremities   Psych-normal affect   Skin- No rash on exposed UE or LE bilaterally      Results Reviewed:  LAB RESULTS:      Lab 23  0556 23   WBC  --  6.31   HEMOGLOBIN  --  14.3   HEMOGLOBIN, POC  --  14.6   HEMATOCRIT  --  42.5   HEMATOCRIT POC  --  43   PLATELETS  --  341   NEUTROS ABS  --  3.32   IMMATURE GRANS (ABS)  --  0.01   LYMPHS ABS  --  2.46   MONOS ABS  --  0.46   EOS ABS  --  0.03   MCV  --  92.6   SED RATE 6  --    CRP <0.30  --          Lab 23  0556 23   SODIUM  --  141   POTASSIUM  --  3.8   CHLORIDE  --  103   CO2  --  25.0   ANION GAP  --  13.0   BUN  --  8   CREATININE  --  0.60  0.64   EGFR  --  105.2  106.8   GLUCOSE  --  88   CALCIUM  --  9.8   HEMOGLOBIN A1C 5.00  --    TSH  --  0.445         Lab 23   TOTAL PROTEIN 7.5   ALBUMIN 4.9   GLOBULIN 2.6   ALT (SGPT) 20   AST (SGOT) 27   BILIRUBIN 0.7   ALK PHOS 76         Lab 23   HSTROP T 10*         Lab 23  0556   CHOLESTEROL 146   LDL CHOL 83   HDL CHOL 52   TRIGLYCERIDES 54         Lab 23  1338   FOLATE >20.00    VITAMIN B 12 951*         Brief Urine Lab Results       None            Microbiology Results Abnormal       None            EEG    Result Date: 7/24/2023  Reason for referral: 53 y.o.female with right sided numbness, consideration of seizures Technical Summary:  A 19 channel digital EEG was performed using the international 10-20 placement system, including eye leads and EKG leads. Duration: 22 minutes Findings: The awake tracing shows diffuse medium amplitude intermixed theta and alpha activity present symmetrically over both hemispheres.  A well-regulated 10 Hz posterior rhythm is evident over the occipital leads.  At one point, 5-6 Hz theta is prominent over the left frontocentral head region for several seconds, and normal variant.  Photic stimulation yields a symmetric driving response.  Hyperventilation does not elicit abnormality.  Drowsiness and light sleep are seen with mild slowing of the background and vertex waves. Video: Available Technical quality: Superior EKG: Regular, 70 bpm SUMMARY: Normal EEG in the awake and asleep states No epileptiform activity seen     Impression: Normal study This report is transcribed using the Dragon dictation system.      MRI Brain Without Contrast    Result Date: 7/22/2023  MRI BRAIN WO CONTRAST Date of Exam: 7/22/2023 8:00 PM EDT Indication: Stroke, follow up.  Comparison: 7/21/2023. Technique:  Routine multiplanar/multisequence sequence images of the brain were obtained without contrast administration. Findings: There is no diffusion restriction to suggest acute infarct. There is no evidence of acute or chronic intracranial hemorrhage. No mass effect or midline shift. No abnormal extra-axial collections. No significant FLAIR signal changes identified. No significant signal abnormality identified. The major vascular flow voids appear intact. The basal ganglia, brainstem and cerebellum appear within normal limits. Calvarial and superficial soft tissue signal is within  normal limits. Orbits appear unremarkable. The paranasal sinuses and the mastoid air cells appear well aerated. Patchy mucosal thickening is present within the left sphenoid sinus and the posterior ethmoid air cells on the left with probable occlusion of the left sphenoethmoidal ostia. No air-fluid levels identified. Midline structures are intact.     Impression: Impression: Unremarkable brain MRI. No acute process identified. No evidence of recent or acute ischemia. No significant signal abnormality. Left-sided paranasal sinus disease as described above with no air-fluid levels identified to suggest acute sinusitis. Electronically Signed: Sherley Zuluaga  7/22/2023 8:22 PM EDT  Workstation ID: [id number]    MRI Brain With Contrast    Result Date: 7/23/2023  MRI BRAIN W CONTRAST Date of Exam: 7/23/2023 12:02 PM EDT Indication: transient right face, arm and leg numbness.  Comparison: 7/22/2023 and prior Technique:  Routine multiplanar/multisequence sequence images of the brain were obtained after the uneventful administration of 10 mL Multihance.  Findings: Postcontrast imaging demonstrate no suspicious areas of enhancement within the brain parenchyma. Paranasal sinus disease again noted     Impression: Impression: No abnormal postcontrast enhancement Electronically Signed: Ghanshyam Kilgore  7/23/2023 2:14 PM EDT  Workstation ID: [id number]    MRI Cervical Spine With & Without Contrast    Result Date: 7/23/2023  MRI CERVICAL SPINE W WO CONTRAST Date of Exam: 7/23/2023 12:04 PM EDT Indication: transient right face, arm and leg numbness.  Comparison: None available. Technique:  Routine multiplanar/multisequence sequence images of the cervical spine were obtained before and after the uneventful administration of 10 mL Multihance.  FINDINGS: Cervical spine alignment appears unremarkable. The craniocervical and atlantoaxial relationships are normal. Visualized marrow signal is unremarkable. Vertebral body heights are normal.  Cervical discs are mildly desiccated. No cervical cord signal abnormality is seen. The paraspinal soft tissues appear unremarkable. Limited visualization of the intracranial structures is unremarkable. C2-C3: No significant spinal canal or neural foraminal stenosis. C3-C4: No significant spinal canal or neural foraminal stenosis. C4-C5: Left greater than right facet arthropathy contributes to mild left neural foraminal narrowing. Spinal canal and right neural foramen appear patent. C5-C6: No significant spinal canal or neural foraminal stenosis. C6-C7: Central annular fissure with broad-based disc bulge and bilateral uncovertebral and facet arthropathy. There is mild to moderate bilateral neural foraminal narrowing. Mild effacement of the anterior thecal sac. Spinal canal is not significantly narrowed. C7-T1: No significant spinal canal or neural foraminal stenosis.     Impression: 1.Mild cervical spondylosis with bilateral neural foraminal narrowing at C6-C7 and mild left neural foraminal narrowing at C4-C5. No significant central spinal canal stenosis is identified within the cervical spine. 2.No abnormal contrast enhancement is identified. 3.Please see above for additional details. Electronically Signed: Ryan Wong  7/23/2023 12:38 PM EDT  Workstation ID: [id number]     Results for orders placed during the hospital encounter of 07/21/23    Adult Transthoracic Echo Complete W/ Cont if Necessary Per Protocol (With Agitated Saline)    Interpretation Summary    Left ventricular systolic function is normal. Estimated left ventricular EF = 70%    The cardiac valves are anatomically and functionally normal.    Estimated right ventricular systolic pressure from tricuspid regurgitation is normal (<35 mmHg).    Saline test results are negative.      Current medications:  Scheduled Meds:aspirin, 81 mg, Oral, Daily   Or  aspirin, 300 mg, Rectal, Daily  atorvastatin, 10 mg, Oral, Nightly  clopidogrel, 75 mg, Oral,  Daily  sodium chloride, 10 mL, Intravenous, Q12H      Continuous Infusions:   PRN Meds:.  LORazepam    sodium chloride    Insert Peripheral IV **AND** sodium chloride    sodium chloride    sodium chloride    Assessment & Plan   Assessment & Plan     Active Hospital Problems    Diagnosis  POA    Numbness [R20.0]  Yes      Resolved Hospital Problems   No resolved problems to display.        Brief Hospital Course to date:  Anastasiya Weaver is a 53 y.o. female with limited PMHx presents with right sided numbness    Right sided numbness -- complex migraine?  - recurrent symptoms continue  - denies history of migraines  - no history of tobacco abuse or DMII (A1c 5.0)  - CTA head and neck negative for stenosis or thrombus  - CT head unremarkable (except for possible sinusitis)  - MRI brain and c-spine negative for suspicious lesions  - EKG NSR  - echo showed normal EF, no shunt  - sed rate 6  - EEG normal  - 24 hour EEG planned    Left sinus disease  - patient says she has a history of sinus disease on the left.  Discussed CT and MRI findings with the patient and reviewed the imaging with her.  - the patient thinks she had a bout of sinusitis last week -- did not take antibiotics, but she now thinks this has resolved.  - recommended flonase use on a regular basis.      Expected Discharge Location and Transportation:   Expected Discharge   Expected Discharge Date: 7/23/2023; Expected Discharge Time:      DVT prophylaxis:  Mechanical DVT prophylaxis orders are present.     AM-PAC 6 Clicks Score (PT): 24 (07/24/23 0830)    CODE STATUS:   Code Status and Medical Interventions:   Ordered at: 07/22/23 0002     Level Of Support Discussed With:    Patient     Code Status (Patient has no pulse and is not breathing):    CPR (Attempt to Resuscitate)     Medical Interventions (Patient has pulse or is breathing):    Full Support     Release to patient:    Routine Release       Dipesh Sorenson MD  07/24/23

## 2023-07-24 NOTE — PROGRESS NOTES
Neurology       Patient Care Team:  Pilar Street MD as PCP - General (Internal Medicine)    Chief complaint: Right-sided tingling    History: Patient continues to have episodes of right-sided tingling which started on her foot progressed to her arm and then her face over period of several seconds.  They last anywhere from 15 to 60 seconds and have given her a sense of heaviness but no true weakness.    Sometimes she has a warm feeling other times it is tingling.  None of them are painful.  None of them are associated with weakness.  In addition she has a sensation of movement associated with these episodes.  She has no visual symptoms difficulty swallowing.  She has no history of migraine no flashing lights or visual blurring.  She is quite familiar with migraines and the fact that her daughter and  both have what sounds like ophthalmic migraines.    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  She has had some recent stressors but does not suffer with any chronic anxiety or depression.    Her only medication is progesterone.    Does have a history of some sinus disease but has documented changes on her scan.    Prior to this week she has never had this symptom before.      History reviewed. No pertinent past medical history.    Vital Signs   Vitals:    07/23/23 2040 07/24/23 0427 07/24/23 0703 07/24/23 1114   BP: 120/79 99/70 108/73 110/72   BP Location: Right arm Right arm Right arm Right arm   Patient Position: Lying Lying Lying Lying   Pulse: 80 72 77 73   Resp: 18 18 18 18   Temp: 97.7 °F (36.5 °C) 97.8 °F (36.6 °C) 97.9 °F (36.6 °C) 97.9 °F (36.6 °C)   TempSrc: Oral Oral Oral Oral   SpO2: 96% 95% 96% 97%   Weight:       Height:           Physical Exam:   General: Pleasant and              Neuro: Oriented to person place and time.    Speech is articulate with no word finding problems.    Coordination is normal on finger-nose testing and fine finger movements.    Cranial nerves show benign fundi equal  pupils full eye movements and full visual fields.    Facial movement sensation are normal.    Palate elevates normally tongue protrudes normally.    Reflexes are 2+ and equal bilaterally.    Motor testing shows normal power and tone in all muscle groups.    Sensory testing is normal including double simultaneous stimulation.        Results Review:  MRI of the brain with and without infusion, CT angiogram of the head and neck and CT perfusion scans were normal.    These were personally reviewed.    EEG was normal with a 20-minute study.    Echocardiogram shows negative bubble study and normal cardiac structures.    EKG was normal as well.    Hemoglobin A1c was 5.    Cholesterol was 146 with LDL of 83 off medications.      Results from last 7 days   Lab Units 07/21/23 2001   WBC 10*3/mm3 6.31   HEMOGLOBIN g/dL 14.3   HEMOGLOBIN, POC g/dL 14.6   HEMATOCRIT % 42.5   HEMATOCRIT POC % 43   PLATELETS 10*3/mm3 341     Results from last 7 days   Lab Units 07/21/23 2001   SODIUM mmol/L 141   POTASSIUM mmol/L 3.8   CHLORIDE mmol/L 103   CO2 mmol/L 25.0   BUN mg/dL 8   CREATININE mg/dL 0.60  0.64   CALCIUM mg/dL 9.8   BILIRUBIN mg/dL 0.7   ALK PHOS U/L 76   ALT (SGPT) U/L 20   AST (SGOT) U/L 27   GLUCOSE mg/dL 88       Imaging Results (Last 24 Hours)       Procedure Component Value Units Date/Time    MRI Brain With Contrast [[id number]] Collected: 07/23/23 1212     Updated: 07/23/23 1417    Narrative:      MRI BRAIN W CONTRAST    Date of Exam: 7/23/2023 12:02 PM EDT    Indication: transient right face, arm and leg numbness.     Comparison: 7/22/2023 and prior    Technique:  Routine multiplanar/multisequence sequence images of the brain were obtained after the uneventful administration of 10 mL Multihance.        Findings:  Postcontrast imaging demonstrate no suspicious areas of enhancement within the brain parenchyma.    Paranasal sinus disease again noted      Impression:      Impression:  No abnormal postcontrast  enhancement        Electronically Signed: Ghanshyam Kilgore    7/23/2023 2:14 PM EDT    Workstation ID: [id number]            Assessment:  Transient right hemisensory defects, cute onset, questionable cause    Plan:  Continuous EEG in hopes of recording spell.    Continue aspirin 81 mg.    Discontinue Plavix.    Decrease Lipitor to 10 mg daily given her family history of leg pain and her relatively low LDL    Comment:  Spells sound like typical complex migraine with the sensory march and vertigo associated with a cortical event.    1 patient being extremely reliable has no recollection of ever having a headache much less anything like a migraine.    Focal sensory seizure would be a low probability.    Seems that the best strategy to treat her for a small vessel disease with best medical therapy get a continuous EEG to make sure he does not have epilepsy, and then have her follow-up in the stroke clinic.         I discussed the patients findings and my recommendations with patient and consulting provider    Hector Salazar MD  07/24/23  14:07 EDT

## 2023-07-24 NOTE — NURSING NOTE
LTM started at 1530. The patient is alert and oriented. Skin check done and verified with Lata RN. No skin breakdown was evident. Advised the RN that skin checks are q4 to prevent skin breakdown.    BASHIR Vera., R EEG T., RPSGT., RST

## 2023-07-25 VITALS
HEIGHT: 60 IN | HEART RATE: 80 BPM | SYSTOLIC BLOOD PRESSURE: 125 MMHG | RESPIRATION RATE: 18 BRPM | OXYGEN SATURATION: 94 % | WEIGHT: 120 LBS | DIASTOLIC BLOOD PRESSURE: 83 MMHG | BODY MASS INDEX: 23.56 KG/M2 | TEMPERATURE: 98 F

## 2023-07-25 PROBLEM — R29.818 TRANSIENT NEUROLOGICAL SYMPTOMS: Status: ACTIVE | Noted: 2023-07-25

## 2023-07-25 LAB — ANA SER QL: NEGATIVE

## 2023-07-25 PROCEDURE — G0378 HOSPITAL OBSERVATION PER HR: HCPCS

## 2023-07-25 RX ORDER — ASPIRIN 81 MG/1
81 TABLET, CHEWABLE ORAL DAILY
Start: 2023-07-26

## 2023-07-25 RX ORDER — ATORVASTATIN CALCIUM 10 MG/1
10 TABLET, FILM COATED ORAL NIGHTLY
Qty: 90 TABLET | Refills: 3 | Status: SHIPPED | OUTPATIENT
Start: 2023-07-25

## 2023-07-25 RX ADMIN — Medication 10 ML: at 09:47

## 2023-07-25 RX ADMIN — ASPIRIN 81 MG: 81 TABLET, CHEWABLE ORAL at 09:46

## 2023-07-25 NOTE — PROGRESS NOTES
Neurology note    Final EEG report shows no abnormalities with 2 significant episodes of heavy sensory loss and 2 minor changes.    Her diagnosis is hemisensory changes unknown cause,?  Migrainous.    Plan continue aspirin 81 mg daily and Lipitor 10 mg daily if tolerated.    Outpatient follow-up with [facility] neurology stroke.    Return if the symptoms change or worsen.    Hector Salazar MD neurology

## 2023-07-25 NOTE — NURSING NOTE
Skin integrity check done reveals no skin breakdown. The RN was unavailable.    SERGIO Edmond BS., R EEG T., RPSGT., RST

## 2023-07-25 NOTE — NURSING NOTE
Rehana EEG check skin integrity/head wrap. Skin checked pt has slight redness FP1-FP2, electrodes slightly moved per guidelines. Head re-wrapped with no complaints. Video/audio recording.

## 2023-07-25 NOTE — DISCHARGE SUMMARY
[facility] Medicine Services  DISCHARGE SUMMARY    Patient Name: Anastasiya Weaver  : 1970  MRN: [id number]    Date of Admission: 2023  7:40 PM  Date of Discharge:  2023  Primary Care Physician: Pilar Street MD    Consults       Date and Time Order Name Status Description    2023 12:03 AM Inpatient Neurology Consult Stroke Completed           Hospital Course     Active Hospital Problems    Diagnosis  POA    **Transient neurological symptoms [R29.818]  Yes    Numbness [R20.0]  Yes      Resolved Hospital Problems   No resolved problems to display.      Hospital Course:  Anastasiya Weaver is a 53 y.o. female with no significant medical history. She presented to [facility] ED on 2023 for evaluation of acute onset right-sided face / lip, hand and leg numbness with associated vertigo. The symptoms lasted approximately 30 seconds and then resolved completely. Approximately 90 minutes later, the symptoms recurred and lasted less than 20 seconds before completely resolving. Due to concerns for stroke, she presented to the ED.     She was admitted to the hospital medicine service. Neurology followed.     Right sided numbness -- complex migraine suspected   - Has continued to have recurrent symptoms. Denies history of complex migraine  - No DMII (A1c 5.0%) or history of tobacco use  - CT head showed possible sinusitis, no acute intracranial abnormalities  - CTA head/neck without evidence of stenosis, thrombosis or large vessel occlusion  - MRI of brain and C-spine negative for suspicious lesions  - EKG NSR  - ESR 6  - EEG normal  - 24H EEG today  - Neurology following. Recommend DC on ASA and Atorvastatin 10mg QHS (stop if myalgias - has strong family history of statin-induced myalgias)  - Follow up with neurology outpatient      Left sinus disease  - Patient reported history of sinus disease on the left. My partner discussed CT and MRI findings  with the patient and reviewed the imaging with her  - Patient thought she may have had a bout of sinusitis last week - did not take antibiotics, but she now thinks this has resolved.  - She was encouraged to use Flonase on a regular basis    Day of Discharge     HPI:   In bed. Neurolgy has seen. Feeling okay and ready for DC this afternoon. No new questions or concerns for me.     Review of Systems  Gen- No fevers, chills  CV- No chest pain, palpitations  Resp- No cough, dyspnea  GI- No N/V/D, abd pain    Vital Signs:   Temp:  [97.6 °F (36.4 °C)-98.1 °F (36.7 °C)] 97.9 °F (36.6 °C)  Heart Rate:  [66-89] 75  Resp:  [18] 18  BP: (116-121)/(74-83) 116/74    Physical Exam:  Constitutional: No acute distress, awake, alert  HENT: NCAT, mucous membranes moist  Respiratory: Clear to auscultation bilaterally, respiratory effort normal   Cardiovascular: RRR, no murmurs, rubs, or gallops  Gastrointestinal: Positive bowel sounds, soft, nontender, nondistended  Musculoskeletal: No bilateral ankle edema  Psychiatric: Appropriate affect, cooperative  Neurologic: Oriented x 3, moves all extremities spontaneously without focal deficits, speech clear  Skin: No rashes    Pertinent  and/or Most Recent Results     LAB RESULTS:      Lab 07/22/23  0556 07/21/23 2001   WBC  --  6.31   HEMOGLOBIN  --  14.3   HEMOGLOBIN, POC  --  14.6   HEMATOCRIT  --  42.5   HEMATOCRIT POC  --  43   PLATELETS  --  341   NEUTROS ABS  --  3.32   IMMATURE GRANS (ABS)  --  0.01   LYMPHS ABS  --  2.46   MONOS ABS  --  0.46   EOS ABS  --  0.03   MCV  --  92.6   SED RATE 6  --    CRP <0.30  --          Lab 07/22/23  0556 07/21/23 2001   SODIUM  --  141   POTASSIUM  --  3.8   CHLORIDE  --  103   CO2  --  25.0   ANION GAP  --  13.0   BUN  --  8   CREATININE  --  0.60  0.64   EGFR  --  105.2  106.8   GLUCOSE  --  88   CALCIUM  --  9.8   HEMOGLOBIN A1C 5.00  --    TSH  --  0.445         Lab 07/21/23 2001   TOTAL PROTEIN 7.5   ALBUMIN 4.9   GLOBULIN 2.6   ALT  (SGPT) 20   AST (SGOT) 27   BILIRUBIN 0.7   ALK PHOS 76         Lab 07/21/23 2001   HSTROP T 10*         Lab 07/22/23  0556   CHOLESTEROL 146   LDL CHOL 83   HDL CHOL 52   TRIGLYCERIDES 54         Lab 07/23/23  1338   FOLATE >20.00   VITAMIN B 12 951*     Brief Urine Lab Results       None          Microbiology Results (last 10 days)       ** No results found for the last 240 hours. **          Adult Transthoracic Echo Complete W/ Cont if Necessary Per Protocol (With Agitated Saline)    Result Date: 7/23/2023    Left ventricular systolic function is normal. Estimated left ventricular EF = 70%   The cardiac valves are anatomically and functionally normal.   Estimated right ventricular systolic pressure from tricuspid regurgitation is normal (<35 mmHg).   Saline test results are negative.     EEG    Result Date: 7/24/2023  Reason for referral: 53 y.o.female with right sided numbness, consideration of seizures Technical Summary:  A 19 channel digital EEG was performed using the international 10-20 placement system, including eye leads and EKG leads. Duration: 22 minutes Findings: The awake tracing shows diffuse medium amplitude intermixed theta and alpha activity present symmetrically over both hemispheres.  A well-regulated 10 Hz posterior rhythm is evident over the occipital leads.  At one point, 5-6 Hz theta is prominent over the left frontocentral head region for several seconds, and normal variant.  Photic stimulation yields a symmetric driving response.  Hyperventilation does not elicit abnormality.  Drowsiness and light sleep are seen with mild slowing of the background and vertex waves. Video: Available Technical quality: Superior EKG: Regular, 70 bpm SUMMARY: Normal EEG in the awake and asleep states No epileptiform activity seen     Normal study This report is transcribed using the Dragon dictation system.      CT Angiogram Neck    Result Date: 7/21/2023  CT ANGIOGRAM NECK, CT ANGIOGRAM HEAD W AI ANALYSIS OF  LVO Date of Exam: 7/21/2023 7:58 PM EDT Indication: right hand/foot/face tingling. Comparison: None available. Technique: CTA of the neck was performed before and after the uneventful intravenous administration of 115 mL Isovue-370. Reconstructed coronal and sagittal images were also obtained. In addition, a 3-D volume rendered image was created for interpretation. Automated exposure control and iterative reconstruction methods were used. Findings: CTA NECK: There is no evidence of significant common, internal, or external carotid artery stenosis or significant carotid plaque in the neck. Vertebral arteries are codominant, uniform and normal in appearance along the length. There is no evidence of significant incidental soft tissue neck pathology. Included lung apices and superior mediastinum appear grossly normal.     Neck CTA appears within normal limits. CTA HEAD: Distal vertebral arteries, basilar artery and posterior cerebral arteries appear within normal limits. There is mild calcification of the supraclinoid right and left ICA. No significant intracranial ICA stenosis is seen. Anterior and middle cerebral arteries and proximal branches appear within normal limits. No significant stenosis is seen. The major dural venous sinuses appear to opacify normally with contrast. Impression: CTA of the head appears within normal limits. Electronically Signed: Tru Hernandes  7/21/2023 8:41 PM EDT  Workstation ID: [id number]    MRI Brain Without Contrast    Result Date: 7/22/2023  MRI BRAIN WO CONTRAST Date of Exam: 7/22/2023 8:00 PM EDT Indication: Stroke, follow up.  Comparison: 7/21/2023. Technique:  Routine multiplanar/multisequence sequence images of the brain were obtained without contrast administration. Findings: There is no diffusion restriction to suggest acute infarct. There is no evidence of acute or chronic intracranial hemorrhage. No mass effect or midline shift. No abnormal extra-axial collections. No significant  FLAIR signal changes identified. No significant signal abnormality identified. The major vascular flow voids appear intact. The basal ganglia, brainstem and cerebellum appear within normal limits. Calvarial and superficial soft tissue signal is within normal limits. Orbits appear unremarkable. The paranasal sinuses and the mastoid air cells appear well aerated. Patchy mucosal thickening is present within the left sphenoid sinus and the posterior ethmoid air cells on the left with probable occlusion of the left sphenoethmoidal ostia. No air-fluid levels identified. Midline structures are intact.     Impression: Unremarkable brain MRI. No acute process identified. No evidence of recent or acute ischemia. No significant signal abnormality. Left-sided paranasal sinus disease as described above with no air-fluid levels identified to suggest acute sinusitis. Electronically Signed: Sherley Zuluaga  7/22/2023 8:22 PM EDT  Workstation ID: [id number]    MRI Brain With Contrast    Result Date: 7/23/2023  MRI BRAIN W CONTRAST Date of Exam: 7/23/2023 12:02 PM EDT Indication: transient right face, arm and leg numbness.  Comparison: 7/22/2023 and prior Technique:  Routine multiplanar/multisequence sequence images of the brain were obtained after the uneventful administration of 10 mL Multihance.  Findings: Postcontrast imaging demonstrate no suspicious areas of enhancement within the brain parenchyma. Paranasal sinus disease again noted     Impression: No abnormal postcontrast enhancement Electronically Signed: Ghanshyam Kilgore  7/23/2023 2:14 PM EDT  Workstation ID: [id number]    MRI Cervical Spine With & Without Contrast    Result Date: 7/23/2023  MRI CERVICAL SPINE W WO CONTRAST Date of Exam: 7/23/2023 12:04 PM EDT Indication: transient right face, arm and leg numbness.  Comparison: None available. Technique:  Routine multiplanar/multisequence sequence images of the cervical spine were obtained before and after the uneventful  administration of 10 mL Multihance.  FINDINGS: Cervical spine alignment appears unremarkable. The craniocervical and atlantoaxial relationships are normal. Visualized marrow signal is unremarkable. Vertebral body heights are normal. Cervical discs are mildly desiccated. No cervical cord signal abnormality is seen. The paraspinal soft tissues appear unremarkable. Limited visualization of the intracranial structures is unremarkable. C2-C3: No significant spinal canal or neural foraminal stenosis. C3-C4: No significant spinal canal or neural foraminal stenosis. C4-C5: Left greater than right facet arthropathy contributes to mild left neural foraminal narrowing. Spinal canal and right neural foramen appear patent. C5-C6: No significant spinal canal or neural foraminal stenosis. C6-C7: Central annular fissure with broad-based disc bulge and bilateral uncovertebral and facet arthropathy. There is mild to moderate bilateral neural foraminal narrowing. Mild effacement of the anterior thecal sac. Spinal canal is not significantly narrowed. C7-T1: No significant spinal canal or neural foraminal stenosis.     1.Mild cervical spondylosis with bilateral neural foraminal narrowing at C6-C7 and mild left neural foraminal narrowing at C4-C5. No significant central spinal canal stenosis is identified within the cervical spine. 2.No abnormal contrast enhancement is identified. 3.Please see above for additional details. Electronically Signed: Ryan Wong  7/23/2023 12:38 PM EDT  Workstation ID: [id number]    XR Chest 1 View    Result Date: 7/21/2023  XR CHEST 1 VW Date of Exam: 7/21/2023 8:33 PM EDT Indication: neuro changes, numbness/tingling Comparison: None available. Findings: The heart, mediastinum and pulmonary vasculature appear within normal limits. Lungs appear well expanded to mildly hyperinflated and clear of active disease. Slight coarsening of the pulmonary interstitial markings is likely chronic. A few granulomatous  calcifications are seen in the right suprahilar region.     Impression: No evidence of active chest disease. Electronically Signed: Tru Hernandes  7/21/2023 8:59 PM EDT  Workstation ID: [id number]    CT Head Without Contrast Stroke Protocol    Result Date: 7/21/2023  CT HEAD WO CONTRAST STROKE PROTOCOL Date of Exam: 7/21/2023 7:55 PM EDT Indication: Transient ischemic attack (TIA). Comparison: None available. Technique: Axial CT images were obtained of the head without contrast administration.  Reconstructed coronal images were also obtained. Automated exposure control and iterative construction methods were used. Scan Time: 2000 Results discussed with stroke team nurse navigator at 2003, 7/21/2023. Findings: The calvarium appears intact. The left sphenoid sinus, and a couple of adjacent ethmoid sinuses are fluid opacified but the remaining paranasal sinuses and mastoids appear clear. Middle ear spaces appear clear. No gross abnormalities are seen of the orbits. The brain appears within normal limits. No evidence of hemorrhage, contusion, or edema is seen. There is no evidence of mass or mass effect, hydrocephalus, or abnormal extra-axial collection. No unusual focal or generalized brain atrophy is seen. No significant white matter changes are appreciated.     Impression: 1. No evidence of acute intracranial disease. 2. Incidentally noted left sphenoid and ethmoid sinus disease. Electronically Signed: Tru Hernandes  7/21/2023 8:08 PM EDT  Workstation ID: [id number]    CT Angiogram Head w AI Analysis of LVO    Result Date: 7/21/2023  CT ANGIOGRAM NECK, CT ANGIOGRAM HEAD W AI ANALYSIS OF LVO Date of Exam: 7/21/2023 7:58 PM EDT Indication: right hand/foot/face tingling. Comparison: None available. Technique: CTA of the neck was performed before and after the uneventful intravenous administration of 115 mL Isovue-370. Reconstructed coronal and sagittal images were also obtained. In addition, a 3-D volume rendered image was created  for interpretation. Automated exposure control and iterative reconstruction methods were used. Findings: CTA NECK: There is no evidence of significant common, internal, or external carotid artery stenosis or significant carotid plaque in the neck. Vertebral arteries are codominant, uniform and normal in appearance along the length. There is no evidence of significant incidental soft tissue neck pathology. Included lung apices and superior mediastinum appear grossly normal.     Neck CTA appears within normal limits. CTA HEAD: Distal vertebral arteries, basilar artery and posterior cerebral arteries appear within normal limits. There is mild calcification of the supraclinoid right and left ICA. No significant intracranial ICA stenosis is seen. Anterior and middle cerebral arteries and proximal branches appear within normal limits. No significant stenosis is seen. The major dural venous sinuses appear to opacify normally with contrast. Impression: CTA of the head appears within normal limits. Electronically Signed: Tru Hernandes  7/21/2023 8:41 PM EDT  Workstation ID: [id number]    CT CEREBRAL PERFUSION WITH & WITHOUT CONTRAST    Result Date: 7/21/2023  CT CEREBRAL PERFUSION W WO CONTRAST Date of Exam: 7/21/2023 7:58 PM EDT Indication: Neuro deficit, acute, stroke suspected.  Comparison: None available. Technique: Axial CT images of the brain were obtained prior to and after the administration of 115 mL Isovue-370. Core blood volume, core blood flow, mean transit time, and Tmax images were obtained utilizing the Rapid software protocol. A limited CT angiogram of the head was also performed to measure the blood vessel density. The radiation dose reduction device was turned on for each scan per the ALARA (As Low as Reasonably Achievable) protocol. Findings: Rapid analysis is negative with no evidence of the brain showing cerebral blood flow less than 30% or T-max greater than 6 seconds. Individual perfusion maps show no  significant asymmetry of blood flow to suggest focal ischemia or infarct.     Impression: Negative perfusion scan. Electronically Signed: Tru Faulknerd  7/21/2023 8:29 PM EDT  Workstation ID: [id number]     Results for orders placed during the hospital encounter of 07/21/23    Adult Transthoracic Echo Complete W/ Cont if Necessary Per Protocol (With Agitated Saline)    Interpretation Summary    Left ventricular systolic function is normal. Estimated left ventricular EF = 70%    The cardiac valves are anatomically and functionally normal.    Estimated right ventricular systolic pressure from tricuspid regurgitation is normal (<35 mmHg).    Saline test results are negative.    Discharge Details        Discharge Medications        New Medications        Instructions Start Date   aspirin 81 MG chewable tablet   81 mg, Oral, Daily   Start Date: July 26, 2023     atorvastatin 10 MG tablet  Commonly known as: LIPITOR   10 mg, Oral, Nightly             Continue These Medications        Instructions Start Date   PROGESTERONE PO   drospiren-e.estrad-l.mefol 3 mg-0.02 mg-0.451 mg(24)/0.451 mg(4)tablet             Allergies   Allergen Reactions    Cefdinir GI Intolerance    Erythromycin GI Intolerance    Penicillins Hives    Sulfa Antibiotics GI Intolerance     Discharge Disposition:  Home or Self Care    Diet:  Diet Order   Procedures    Diet: Regular/House Diet; Texture: Regular Texture (IDDSI 7); Fluid Consistency: Thin (IDDSI 0)     Activity:  Activity Instructions       Activity as Tolerated            CODE STATUS:    Code Status and Medical Interventions:   Ordered at: 07/22/23 0002     Level Of Support Discussed With:    Patient     Code Status (Patient has no pulse and is not breathing):    CPR (Attempt to Resuscitate)     Medical Interventions (Patient has pulse or is breathing):    Full Support     Release to patient:    Routine Release     No future appointments.    Additional Instructions for the Follow-ups that You Need  to Schedule       Discharge Follow-up with Specified Provider: Neurology in 2-3 months   As directed      To: Neurology in 2-3 months               Triny Lira PA-C  07/25/23    Time Spent on Discharge:  I spent 25 minutes on this discharge activity which included: face-to-face encounter with the patient, reviewing the data in the system, coordination of the care with the nursing staff as well as consultants, documentation, and entering orders.

## 2023-07-25 NOTE — PLAN OF CARE
Goal Outcome Evaluation:           Progress: no change  Outcome Evaluation: Pt waiting for MRI to be completed. Neurology has asked to be called when MRI is complete,. Glucose 80 at lunch. Remins sinus on the monitor. Waiting a discharge plan when ready.         
Goal Outcome Evaluation:   SLP evaluation completed. Will sign-off for communication. Please see note for further details and recommendations.                       
Goal Outcome Evaluation:  Plan of Care Reviewed With: patient           Outcome Evaluation: Pt is indep with mobility. She has equal BLE strength, good coordination, and good balance. Skilled PT services are not indicated at ths time. This was discussed and agreed upon with patient. Discharge P.T.         
Goal Outcome Evaluation:  Plan of Care Reviewed With: patient        Progress: improving  Outcome Evaluation: OT evaluation completed. Pt presents with baseline performance in ADLs and related t/fs and mobility with I in all w/o any use of AD. Pt has functional skill set including BUE strength, sensory awareness, coordination, ROM, balance and denies any acute changes to vision and indicates admitting s/s have resolved. Pt was educated on s/s of CVA and seeking timely care in prep for recommended return home with family A as needed at d/c. No further OT services req'd at this time, will d/c.      Anticipated Discharge Disposition (OT): home with assist  
Goal Outcome Evaluation:  Plan of Care Reviewed With: patient        Progress: no change  Outcome Evaluation: Continuous EEG in progress. NIHSS 0 Remains sinus on the monitor. One episode noted today prior to Continuous EEG monitoring. Dr. Salazar here just after event time. Waiting a discharge plan when ready.         
Goal Outcome Evaluation:  Plan of Care Reviewed With: patient        Progress: no change  Outcome Evaluation: Patient has had 2 episodes of numbness on the right side with a warm feeling . Ativan given with the second episde. To MRI today. Waiting EEG to be done. NIHSS score is 0. Numbness does not last for long periods , only a minute or two.  Waiting results of lab work Remains sinus on the monitor.         
Goal Outcome Evaluation:  Plan of Care Reviewed With: patient A&OX4 VSS Continuous EEG in progress. NIH 0. Neuo checks WNL. No episodes of numbness or tingling.Possible DC home today,                      
functional limitations in following categories/impairments found

## 2023-07-25 NOTE — SIGNIFICANT NOTE
Spoke with patient regarding using bathroom and unhooking EEG.  PT reports EEG instructed her on unhooking machine for using bathroom.  PT requested this RN to recheck the machine after she unhooked it; explained to PT that I would call the EEG tech to come assess machine.   Spoke with Rehana in EEG,tech will be sent to assess machine connection and function.

## 2023-08-16 ENCOUNTER — TRANSCRIBE ORDERS (OUTPATIENT)
Dept: ADMINISTRATIVE | Facility: HOSPITAL | Age: 53
End: 2023-08-16
Payer: COMMERCIAL

## 2023-08-16 DIAGNOSIS — Z12.31 ENCOUNTER FOR SCREENING MAMMOGRAM FOR BREAST CANCER: Primary | ICD-10-CM

## 2023-09-21 ENCOUNTER — HOSPITAL ENCOUNTER (OUTPATIENT)
Dept: MAMMOGRAPHY | Facility: HOSPITAL | Age: 53
Discharge: HOME | End: 2023-09-21
Admitting: INTERNAL MEDICINE
Payer: COMMERCIAL

## 2023-09-21 DIAGNOSIS — Z12.31 ENCOUNTER FOR SCREENING MAMMOGRAM FOR BREAST CANCER: ICD-10-CM

## 2023-09-21 PROCEDURE — 77063 BREAST TOMOSYNTHESIS BI: CPT

## 2023-09-21 PROCEDURE — 77067 SCR MAMMO BI INCL CAD: CPT

## 2024-08-06 ENCOUNTER — TRANSCRIBE ORDERS (OUTPATIENT)
Dept: ADMINISTRATIVE | Facility: HOSPITAL | Age: 54
End: 2024-08-06
Payer: COMMERCIAL

## 2024-08-06 DIAGNOSIS — Z12.31 VISIT FOR SCREENING MAMMOGRAM: Primary | ICD-10-CM

## 2024-09-23 ENCOUNTER — HOSPITAL ENCOUNTER (OUTPATIENT)
Dept: MAMMOGRAPHY | Facility: HOSPITAL | Age: 54
Discharge: HOME | End: 2024-09-23
Admitting: INTERNAL MEDICINE
Payer: COMMERCIAL

## 2024-09-23 DIAGNOSIS — Z12.31 VISIT FOR SCREENING MAMMOGRAM: ICD-10-CM

## 2024-09-23 PROCEDURE — 77063 BREAST TOMOSYNTHESIS BI: CPT

## 2024-09-23 PROCEDURE — 77067 SCR MAMMO BI INCL CAD: CPT

## 2024-09-25 PROCEDURE — 77067 SCR MAMMO BI INCL CAD: CPT | Performed by: RADIOLOGY

## 2024-09-25 PROCEDURE — 77063 BREAST TOMOSYNTHESIS BI: CPT | Performed by: RADIOLOGY

## 2025-08-26 ENCOUNTER — TRANSCRIBE ORDERS (OUTPATIENT)
Dept: ADMINISTRATIVE | Facility: HOSPITAL | Age: 55
End: 2025-08-26
Payer: COMMERCIAL

## 2025-08-26 DIAGNOSIS — Z12.31 VISIT FOR SCREENING MAMMOGRAM: Primary | ICD-10-CM
